# Patient Record
Sex: FEMALE | Race: WHITE | NOT HISPANIC OR LATINO | Employment: OTHER | URBAN - METROPOLITAN AREA
[De-identification: names, ages, dates, MRNs, and addresses within clinical notes are randomized per-mention and may not be internally consistent; named-entity substitution may affect disease eponyms.]

---

## 2017-06-19 ENCOUNTER — THERAPY VISIT (OUTPATIENT)
Dept: PHYSICAL THERAPY | Facility: CLINIC | Age: 67
End: 2017-06-19
Payer: COMMERCIAL

## 2017-06-19 DIAGNOSIS — M25.562 LEFT KNEE PAIN: Primary | ICD-10-CM

## 2017-06-19 DIAGNOSIS — M25.561 RIGHT KNEE PAIN: ICD-10-CM

## 2017-06-19 PROCEDURE — 97161 PT EVAL LOW COMPLEX 20 MIN: CPT | Mod: GP | Performed by: PHYSICAL THERAPIST

## 2017-06-19 PROCEDURE — G8979 MOBILITY GOAL STATUS: HCPCS | Mod: GP | Performed by: PHYSICAL THERAPIST

## 2017-06-19 PROCEDURE — 97110 THERAPEUTIC EXERCISES: CPT | Mod: GP | Performed by: PHYSICAL THERAPIST

## 2017-06-19 PROCEDURE — G8978 MOBILITY CURRENT STATUS: HCPCS | Mod: GP | Performed by: PHYSICAL THERAPIST

## 2017-06-19 ASSESSMENT — ACTIVITIES OF DAILY LIVING (ADL)
HOW_WOULD_YOU_RATE_THE_OVERALL_FUNCTION_OF_YOUR_KNEE_DURING_YOUR_USUAL_DAILY_ACTIVITIES?: ABNORMAL
RAW_SCORE: 40
SWELLING: I HAVE THE SYMPTOM BUT IT DOES NOT AFFECT MY ACTIVITY
GO UP STAIRS: ACTIVITY IS MINIMALLY DIFFICULT
HOW_WOULD_YOU_RATE_THE_CURRENT_FUNCTION_OF_YOUR_KNEE_DURING_YOUR_USUAL_DAILY_ACTIVITIES_ON_A_SCALE_FROM_0_TO_100_WITH_100_BEING_YOUR_LEVEL_OF_KNEE_FUNCTION_PRIOR_TO_YOUR_INJURY_AND_0_BEING_THE_INABILITY_TO_PERFORM_ANY_OF_YOUR_USUAL_DAILY_ACTIVITIES?: 50
PAIN: THE SYMPTOM AFFECTS MY ACTIVITY MODERATELY
LIMPING: THE SYMPTOM AFFECTS MY ACTIVITY MODERATELY
KNEEL ON THE FRONT OF YOUR KNEE: ACTIVITY IS FAIRLY DIFFICULT
STAND: ACTIVITY IS SOMEWHAT DIFFICULT
SIT WITH YOUR KNEE BENT: ACTIVITY IS MINIMALLY DIFFICULT
SQUAT: ACTIVITY IS VERY DIFFICULT
WEAKNESS: THE SYMPTOM AFFECTS MY ACTIVITY MODERATELY
RISE FROM A CHAIR: ACTIVITY IS MINIMALLY DIFFICULT
STIFFNESS: I DO NOT HAVE THE SYMPTOM
GO DOWN STAIRS: ACTIVITY IS FAIRLY DIFFICULT
AS_A_RESULT_OF_YOUR_KNEE_INJURY,_HOW_WOULD_YOU_RATE_YOUR_CURRENT_LEVEL_OF_DAILY_ACTIVITY?: ABNORMAL
WALK: ACTIVITY IS SOMEWHAT DIFFICULT
KNEE_ACTIVITY_OF_DAILY_LIVING_SUM: 40
KNEE_ACTIVITY_OF_DAILY_LIVING_SCORE: 57.14
GIVING WAY, BUCKLING OR SHIFTING OF KNEE: THE SYMPTOM AFFECTS MY ACTIVITY MODERATELY

## 2017-06-19 NOTE — LETTER
[facility]  [address] [address]  [phone]    2017  Re: Ekaterina Delcid   :   1950  MRN:  [id number]   REFERRING PHYSICIAN:   Garo Urena    [facility]  Date of Initial Evaluation:  2017  Visits:  Rxs Used: 1  Reason for Referral:     Left knee pain  Right knee pain  Physical Therapy Initial Evaluation   17  Garo Urena MD Precautions/Restrictions/MD instructions: B knee pain, E and T   Therapist Impression:   Pt is a 68 y/o female, with 3 month history of B knee pain. Pt presents with pain, weakness, effusion, and balance deficits. These impairments limit their ability to ambulate, navigate stairs, perform ADL's, and complete household duties. Skilled PT services necessary in order to reduce impairments and improve independent function.  Subjective:   Chief Complaint: Both knees are bothersome over the past few months it has increased. L lateral knee pain and R posterior knee pain. Notes that stairs are the most bothersome for her knees. Feel two weeks ago on L knee (xrays demo OA but no fracture). Does note that her back has been bugging her more as well.  DOI/onset: past 3 months increased DOS: past L TKA ()  Location: see above Quality: achy and sometimes sharp  Frequency: stairs Radiates: nil  Pain scale: Rest 0/10 Activity 6/10   Time of day: with WB activity Sleeping: not sleeping well   Exacerbated by: stairs specifically but also walking Relieved by: rest, ice Progression: worsening over the past couple of weeks  Previous Treatment: PT last eyar Effect of prior treatment: helpful, but stopped doing exercises   PMH and/or surgical history: L TKA Dec 2015, OA, Cancer, overweight, heart problems, depression, thyroid problems, menopausal, B 1st MTP fusions   Imaging: x rays demo OA on R knee, no acute fx    Occupation: retrired Job duties:  household duties   Current HEP/exercise regimen: none currently Patient's goals: cardiac, thyroid, sleep, antidepressants  Re: Ekaterina Delcid   :   1950    Medications: cardiac, thyroid, sleep, anti-depressants  General health as reported by patient: good  Return to MD: PRN         Objective:  Knee Evaluation:  Gait: reduced control of pronation, increased femoral IR, B  Functional:   Single-Limb Support: incr hip and ankle sway on R, L better  Effusion Stroke Test: trace on R, none on L  PROM:   Knee End Feel   Left 5-0-126 WNL   Right 5-0-131 pain   Strength:   L R   HIP     Ext 4+/5 4/5   Abd 4/5 3+/5   ER 4+/5 4/5   KNEE     Quad Set painful painful   SLR W/o lag W/o lag     Assessment/Plan:    Patient is a 67 year old female with both sides knee complaints.    Patient has the following significant findings with corresponding treatment plan.                Diagnosis 1:  B knee pain  Pain -  hot/cold therapy, manual therapy, splint/taping/bracing/orthotics, education and home program  Decreased ROM/flexibility - manual therapy and therapeutic exercise  Decreased joint mobility - manual therapy and therapeutic exercise  Decreased strength - therapeutic exercise and therapeutic activities  Impaired balance - neuro re-education and therapeutic activities  Decreased proprioception - neuro re-education and therapeutic activities  Edema - cold therapy  Impaired gait - gait training  Impaired muscle performance - neuro re-education  Decreased function - therapeutic activities  Impaired posture - neuro re-education              Re: Ekaterina Delcid   :   1950    Therapy Evaluation Codes:   1) History comprised of:   Personal factors that impact the plan of care:      None.    Comorbidity factors that impact the plan of care are:      Cancer, Depression, Heart problems, Menopausal, Osteoarthritis, Overweight and Pain at night/rest.     Medications impacting care: Anti-depressant, Cardiac, Sleep and  thyroid.  2) Examination of Body Systems comprised of:   Body structures and functions that impact the plan of care:      Hip and Knee.   Activity limitations that impact the plan of care are:      Dressing, Sitting, Squatting/kneeling, Stairs, Standing, Walking and Sleeping.  3) Clinical presentation characteristics are:   Stable/Uncomplicated.  4) Decision-Making    Moderate complexity using standardized patient assessment instrument and/or measureable assessment of functional outcome.  Cumulative Therapy Evaluation is: Low complexity.    Previous and current functional limitations:  (See Goal Flow Sheet for this information)    Short term and Long term goals: (See Goal Flow Sheet for this information)   Communication ability:  Patient appears to be able to clearly communicate and understand verbal and written communication and follow directions correctly.  Treatment Explanation - The following has been discussed with the patient:   RX ordered/plan of care  Anticipated outcomes  Possible risks and side effects  This patient would benefit from PT intervention to resume normal activities.   Rehab potential is good.  Frequency:  1 X week, once daily  Duration:  for 4 weeks tapering to 2 X a month over 4 weeks  Discharge Plan:  Achieve all LTG.  Independent in home treatment program.  Reach maximal therapeutic benefit.    Thank you for your referral.    INQUIRIES  Therapist: Roscoe Gorman, PT, DPT, SCS, CSCS  [facility] PHYSICAL THERAPY  [address] [place] [id number]  Phone: [phone]  Fax: [phone]

## 2017-06-19 NOTE — PROGRESS NOTES
Subjective:  Physical Therapy Initial Evaluation   6/19/17  Garo Urena MD Precautions/Restrictions/MD instructions: B knee pain, E and T   Therapist Impression:   Pt is a 68 y/o female, with 3 month history of B knee pain. Pt presents with pain, weakness, effusion, and balance deficits. These impairments limit their ability to ambulate, navigate stairs, perform ADL's, and complete household duties. Skilled PT services necessary in order to reduce impairments and improve independent function.    Subjective:   Chief Complaint: Both knees are bothersome over the past few months it has increased. L lateral knee pain and R posterior knee pain. Notes that stairs are the most bothersome for her knees. Feel two weeks ago on L knee (xrays demo OA but no fracture). Does note that her back has been bugging her more as well.  DOI/onset: 3/19/17 DOS: past L TKA (2015)  Location: see above Quality: achy and sometimes sharp  Frequency: stairs Radiates: nil  Pain scale: Rest 0/10 Activity 6/10   Time of day: with WB activity Sleeping: not sleeping well   Exacerbated by: stairs specifically but also walking Relieved by: rest, ice Progression: worsening over the past couple of weeks  Previous Treatment: PT last eyar Effect of prior treatment: helpful, but stopped doing exercises   PMH and/or surgical history: L TKA Dec 2015, OA, Cancer, overweight, heart problems, depression, thyroid problems, menopausal, B 1st MTP fusions   Imaging: x rays demo OA on R knee, no acute fx    Occupation: retrired Job duties: household duties   Current HEP/exercise regimen: none currently Patient's goals: cardiac, thyroid, sleep, antidepressants  Medications: cardiac, thyroid, sleep, anti-depressants  General health as reported by patient: good  Return to MD: PRN  HPI                  Objective:  Knee Evaluation:  Gait: reduced control of pronation, increased femoral IR, B    Functional:    Single-Limb Support: incr hip and ankle sway on R, L  better    Effusion Stroke Test: trace on R, none on L     PROM:   Knee End Feel   Left 5-0-126 WNL   Right 5-0-131 pain   Strength:   L R   HIP     Ext 4+/5 4/5   Abd 4/5 3+/5   ER 4+/5 4/5   KNEE     Quad Set painful painful   SLR W/o lag W/o lag     System    Physical Exam    General     ROS    Assessment/Plan:      Patient is a 67 year old female with both sides knee complaints.    Patient has the following significant findings with corresponding treatment plan.                Diagnosis 1:  B knee pain  Pain -  hot/cold therapy, manual therapy, splint/taping/bracing/orthotics, education and home program  Decreased ROM/flexibility - manual therapy and therapeutic exercise  Decreased joint mobility - manual therapy and therapeutic exercise  Decreased strength - therapeutic exercise and therapeutic activities  Impaired balance - neuro re-education and therapeutic activities  Decreased proprioception - neuro re-education and therapeutic activities  Edema - cold therapy  Impaired gait - gait training  Impaired muscle performance - neuro re-education  Decreased function - therapeutic activities  Impaired posture - neuro re-education    Therapy Evaluation Codes:   1) History comprised of:   Personal factors that impact the plan of care:      None.    Comorbidity factors that impact the plan of care are:      Cancer, Depression, Heart problems, Menopausal, Osteoarthritis, Overweight and Pain at night/rest.     Medications impacting care: Anti-depressant, Cardiac, Sleep and thyroid.  2) Examination of Body Systems comprised of:   Body structures and functions that impact the plan of care:      Hip and Knee.   Activity limitations that impact the plan of care are:      Dressing, Sitting, Squatting/kneeling, Stairs, Standing, Walking and Sleeping.  3) Clinical presentation characteristics are:   Stable/Uncomplicated.  4) Decision-Making    Moderate complexity using standardized patient assessment instrument and/or  measureable assessment of functional outcome.  Cumulative Therapy Evaluation is: Low complexity.    Previous and current functional limitations:  (See Goal Flow Sheet for this information)    Short term and Long term goals: (See Goal Flow Sheet for this information)     Communication ability:  Patient appears to be able to clearly communicate and understand verbal and written communication and follow directions correctly.  Treatment Explanation - The following has been discussed with the patient:   RX ordered/plan of care  Anticipated outcomes  Possible risks and side effects  This patient would benefit from PT intervention to resume normal activities.   Rehab potential is good.    Frequency:  1 X week, once daily  Duration:  for 4 weeks tapering to 2 X a month over 4 weeks  Discharge Plan:  Achieve all LTG.  Independent in home treatment program.  Reach maximal therapeutic benefit.    Please refer to the daily flowsheet for treatment today, total treatment time and time spent performing 1:1 timed codes.

## 2017-06-19 NOTE — LETTER
DEPARTMENT OF HEALTH AND HUMAN SERVICES  [facility] FOR MEDICARE & MEDICAID SERVICES    PLAN/UPDATED PLAN OF PROGRESS FOR OUTPATIENT REHABILITATION    PATIENTS NAME:  Ekaterina Delcid   : 1950  PROVIDER NUMBER:    [id number]  [id number]  [facility] PHYSICAL THERAPY  MEDICAL RECORD NUMBER: [id number]   START OF CARE DATE:  SOC Date: 17   TYPE:  PT  PRIMARY/TREATMENT DIAGNOSIS: (Pertinent Medical Diagnosis)  Left knee pain  Right knee pain  VISITS FROM START OF CARE:  1  Rxs Used: 1    Physical Therapy Initial Evaluation   17  Garo Urena MD Precautions/Restrictions/MD instructions: B knee pain, E and T   Therapist Impression:   Pt is a 68 y/o female, with 3 month history of B knee pain. Pt presents with pain, weakness, effusion, and balance deficits. These impairments limit their ability to ambulate, navigate stairs, perform ADL's, and complete household duties. Skilled PT services necessary in order to reduce impairments and improve independent function.    Subjective:   Chief Complaint: Both knees are bothersome over the past few months it has increased. L lateral knee pain and R posterior knee pain. Notes that stairs are the most bothersome for her knees. Feel two weeks ago on L knee (xrays demo OA but no fracture). Does note that her back has been bugging her more as well.  DOI/onset: 3/19/17 DOS: past L TKA ()  Location: see above Quality: achy and sometimes sharp  Frequency: stairs Radiates: nil  Pain scale: Rest 0/10 Activity 6/10   Time of day: with WB activity Sleeping: not sleeping well   Exacerbated by: stairs specifically but also walking Relieved by: rest, ice Progression: worsening over the past couple of weeks  Previous Treatment: PT last eyar Effect of prior treatment: helpful, but stopped doing exercises   PMH and/or surgical history: L TKA Dec 2015, OA, Cancer, overweight, heart problems, depression, thyroid problems,  menopausal, B 1st MTP fusions     PATIENTS NAME:  Ekaterina Delcid   : 1950    Imaging: x rays demo OA on R knee, no acute fx    Occupation: retrired Job duties: household duties   Current HEP/exercise regimen: none currently Patient's goals: cardiac, thyroid, sleep, antidepressants  Medications: cardiac, thyroid, sleep, anti-depressants  General health as reported by patient: good  Return to MD: PRN                    Objective:  Knee Evaluation:  Gait: reduced control of pronation, increased femoral IR, B  Functional:    Single-Limb Support: incr hip and ankle sway on R, L better    Effusion Stroke Test: trace on R, none on L    PROM:   Knee End Feel   Left 5-0-126 WNL   Right 5-0-131 pain     Strength:   L R   HIP     Ext 4+/5 4/5   Abd 4/5 3+/5   ER 4+/5 4/5   KNEE     Quad Set painful painful   SLR W/o lag W/o lag     Assessment/Plan:    Patient is a 67 year old female with both sides knee complaints.    Patient has the following significant findings with corresponding treatment plan.               PATIENTS NAME:  Ekaterina Delcid   : 1950      Diagnosis 1:  B knee pain  Pain -  hot/cold therapy, manual therapy, splint/taping/bracing/orthotics, education and home program  Decreased ROM/flexibility - manual therapy and therapeutic exercise  Decreased joint mobility - manual therapy and therapeutic exercise  Decreased strength - therapeutic exercise and therapeutic activities  Impaired balance - neuro re-education and therapeutic activities  Decreased proprioception - neuro re-education and therapeutic activities  Edema - cold therapy  Impaired gait - gait training  Impaired muscle performance - neuro re-education  Decreased function - therapeutic activities  Impaired posture - neuro re-education  Therapy Evaluation Codes:   1) History comprised of:   Personal factors that impact the plan of care:      None.    Comorbidity factors that impact the plan of care are:      Cancer, Depression, Heart  problems, Menopausal, Osteoarthritis, Overweight and Pain at night/rest.     Medications impacting care: Anti-depressant, Cardiac, Sleep and thyroid.  2) Examination of Body Systems comprised of:   Body structures and functions that impact the plan of care:      Hip and Knee.   Activity limitations that impact the plan of care are:      Dressing, Sitting, Squatting/kneeling, Stairs, Standing, Walking and Sleeping.  3) Clinical presentation characteristics are:   Stable/Uncomplicated.  4) Decision-Making    Moderate complexity using standardized patient assessment instrument and/or measureable assessment of functional outcome.  Cumulative Therapy Evaluation is: Low complexity.  Previous and current functional limitations:  (See Goal Flow Sheet for this information)    Short term and Long term goals: (See Goal Flow Sheet for this information)   Communication ability:  Patient appears to be able to clearly communicate and understand verbal and written communication and follow directions correctly.  Treatment Explanation - The following has been discussed with the patient:   RX ordered/plan of care  Anticipated outcomes  Possible risks and side effects  This patient would benefit from PT intervention to resume normal activities.   Rehab potential is good.  Frequency:  1 X week, once daily  Duration:  for 4 weeks tapering to 2 X a month over 4 weeks  Discharge Plan:  Achieve all LTG.  Independent in home treatment program.  Reach maximal therapeutic benefit.        PATIENTS NAME:  Ekaterina Delcid   : 1950                Caregiver Signature/Credentials _____________________________ Date ________                Roscoe Gorman DPT   I have reviewed and certified the need for these services and plan of treatment while under my care.        PHYSICIAN'S SIGNATURE:   _____________________________________  Date___________              Garo Urena MD    Certification period:  Beginning of Cert date period: 17  "to  End of Cert period date: 09/13/17     Functional Level Progress Report: Please see attached \"Goal Flow sheet for Functional level.\"    ____X____ Continue Services or       ________ DC Services                Service dates: From  SOC Date: 06/19/17 date to present                         "

## 2017-06-19 NOTE — MR AVS SNAPSHOT
After Visit Summary   6/19/2017    Ekaterina Delcid    MRN: [id number]           Patient Information     Date Of Birth          1950        Visit Information        Provider Department      6/19/2017 11:30 AM Roscoe Gorman PT [facility] Physical Therapy        Today's Diagnoses     Left knee pain    -  1    Right knee pain           Follow-ups after your visit        Your next 10 appointments already scheduled     Jun 27, 2017 11:30 AM CDT   EMIR Extremity with Roscoe Gorman PT   [facility] Physical Therapy ([facility]  )    [address] [place] [address]   [phone]            Jul 06, 2017 12:40 PM CDT   EMIR Extremity with Roscoe Gorman PT   [facility] Physical Therapy ([facility]  )    [address] [place] [address]   [phone]            Jul 13, 2017  8:10 AM CDT   EMIR Extremity with Roscoe Gorman PT   [facility]  [facility]    [address] [place] [address]   [phone]            Jul 20, 2017  8:10 AM CDT   EMIR Extremity with Roscoe Gorman PT   [facility] Physical Therapy ([facility]  )    [address] [place] [address]   [phone]              Who to contact     If you have questions or need follow up information about today's clinic visit or your schedule please contact [facility] PHYSICAL THERAPY directly at [phone].  Normal or non-critical lab and imaging results will be communicated to you by MyChart, letter or phone within 4 business days after the clinic has received the results. If you do not hear from us within 7 days, please contact the clinic through MyChart or phone. If you have a critical or abnormal lab result, we will notify you by phone as soon as possible.  Submit  "refill requests through [employer] or call your pharmacy and they will forward the refill request to us. Please allow 3 business days for your refill to be completed.          Additional Information About Your Visit        [employer] Information     [employer] lets you send messages to your doctor, view your test results, renew your prescriptions, schedule appointments and more. To sign up, go to [email or web address] . Click on \"Log in\" on the left side of the screen, which will take you to the Welcome page. Then click on \"Sign up Now\" on the right side of the page.     You will be asked to enter the access code listed below, as well as some personal information. Please follow the directions to create your username and password.     Your access code is: [email or web address]  Expires: 2017 12:52 PM     Your access code will  in 90 days. If you need help or a new code, please call your [place] clinic or [phone].        Care EveryWhere ID     This is your Care EveryWhere ID. This could be used by other organizations to access your [facility] medical records  [id number]         Blood Pressure from Last 3 Encounters:   05/19/15 133/89   11/10/14 135/64   14 123/83    Weight from Last 3 Encounters:   14 72.8 kg (160 lb 8 oz)   14 73.5 kg (162 lb)   14 73.5 kg (162 lb)              We Performed the Following     HC PT EVAL, LOW COMPLEXITY     EMIR INITIAL EVAL REPORT     THERAPEUTIC EXERCISES        Primary Care Provider Office Phone # Fax #    Mark Torres [phone]       [facility] MED SPEC [address]   [facility] [address]        Thank you!     Thank you for choosing [facility] PHYSICAL THERAPY  for your care. Our goal is always to provide you with excellent care. Hearing back from our patients is one way we can continue to improve our services. Please take a few minutes to complete the written survey that you may receive in " the mail after your visit with us. Thank you!             Your Updated Medication List - Protect others around you: Learn how to safely use, store and throw away your medicines at www.disposemymeds.org.          This list is accurate as of: 6/19/17 12:52 PM.  Always use your most recent med list.                   Brand Name Dispense Instructions for use    ASPIRIN PO      Take 81 mg by mouth daily       ATORVASTATIN CALCIUM PO      Take 40 mg by mouth daily       BUSPIRONE HCL PO      Take 30 mg by mouth 2 times daily       carboxymethylcellulose 0.5 % Soln ophthalmic solution    REFRESH PLUS    1 Bottle    Place 1 drop into both eyes 3 times daily as needed for dry eyes       COENZYME Q-10 PO      Take 100 mg by mouth daily        MG Caps      Take 3 capsules by mouth daily       DHA-EPA-Vit B6-B12-Folic Acid Caps      Take 1 capsule by mouth daily       diclofenac 1 % Gel topical gel    VOLTAREN    100 g    Apply 4 grams to knees or 2 grams to hands four times daily using enclosed dosing card.       gabapentin 300 MG capsule    NEURONTIN    120 capsule    Take 1 capsule (300 mg) by mouth 4 times daily       LEVOTHYROXINE SODIUM PO      Take 0.125 mg by mouth 1 tablet 6 days per week and 1/2 tablet the 7th day       LISINOPRIL PO      Take 5 mg by mouth daily       magnesium 100 MG Caps      Take 100 mg by mouth daily       METOPROLOL TARTRATE PO      Take 12.5 mg by mouth daily       MULTIVITAMIN PO      Take 2 capsules by mouth daily       NITROGLYCERIN SL      Place 0.4 mg under the tongue every 5 minutes as needed       TYLENOL PO      Take 500-1,000 mg by mouth every 4 hours as needed for mild pain or fever       VISTARIL PO      Take 50 mg by mouth 4 times daily       Vitamin D-3 1000 UNITS Caps      Take 1 capsule by mouth daily       ZOLOFT 100 MG tablet   Generic drug:  sertraline      Take by mouth daily       zolpidem 6.25 MG CR tablet    AMBIEN CR    15 tablet    Take 1 tablet (6.25 mg) by mouth  nightly as needed for sleep

## 2017-06-19 NOTE — LETTER
DEPARTMENT OF HEALTH AND HUMAN SERVICES  CENTERS FOR MEDICARE & MEDICAID SERVICES    PLAN/UPDATED PLAN OF PROGRESS FOR OUTPATIENT REHABILITATION    PATIENTS NAME:  Ekaterina Delcid   : 1950  PR[facility] NUMBER:    [id number]  [facility]     [facility]ER NAME: [place] [facility]ICAL [facility]  MEDICAL RECORD NUMBER: [id number]   START OF CARE DATE:    2017  TYPE:  PT  PRIMARY/TREATMENT DIAGNOSIS: (Pertinent Medical Diagnosis)  Left knee pain  Right knee pain  VISITS FROM START OF CARE:  Rxs Used: 1     Physical Therapy Initial Evaluation   17  Garo Urena MD Precautions/Restrictions/MD instructions: B knee pain, E and T   Therapist Impression:   Pt is a 68 y/o female, with 3 month history of B knee pain. Pt presents with pain, weakness, effusion, and balance deficits. These impairments limit their ability to ambulate, navigate stairs, perform ADL's, and complete household duties. Skilled PT services necessary in order to reduce impairments and improve independent function.    Subjective:   Chief Complaint: Both knees are bothersome over the past few months it has increased. L lateral knee pain and R posterior knee pain. Notes that stairs are the most bothersome for her knees. Feel two weeks ago on L knee (xrays demo OA but no fracture). Does note that her back has been bugging her more as well.  DOI/onset: past 3 months increased DOS: past L TKA ()  Location: see above Quality: achy and sometimes sharp  Frequency: stairs Radiates: nil  Pain scale: Rest 0/10 Activity 6/10   Time of day: with WB activity Sleeping: not sleeping well   Exacerbated by: stairs specifically but also walking Relieved by: rest, ice Progression: worsening over the past couple of weeks  Previous Treatment: PT last eyar Effect of prior treatment: helpful, but stopped doing exercises   PMH and/or surgical history: L TKA Dec 2015, OA, Cancer, overweight, heart problems, depression, thyroid  problems, menopausal, B 1st MTP fusions   PATIENTS NAME:  Ekaterina Delcid   : 1950    Imaging: x rays demo OA on R knee, no acute fx    Occupation: retrired Job duties: household duties   Current HEP/exercise regimen: none currently Patient's goals: cardiac, thyroid, sleep, antidepressants  Medications: cardiac, thyroid, sleep, anti-depressants  General health as reported by patient: good  Return to MD: PRN  HPI                  Objective:  Knee Evaluation:  Gait: reduced control of pronation, increased femoral IR, B    Functional:    Single-Limb Support: incr hip and ankle sway on R, L better    Effusion Stroke Test: trace on R, none on L     PROM:   Knee End Feel   Left 5-0-126 WNL   Right 5-0-131 pain   Strength:   L R   HIP     Ext 4+/5 4/5   Abd 4/5 3+/5   ER 4+/5 4/5   KNEE     Quad Set painful painful   SLR W/o lag W/o lag     Assessment/Plan:    Patient is a 67 year old female with both sides knee complaints.    Patient has the following significant findings with corresponding treatment plan.                Diagnosis 1:  B knee pain  Pain -  hot/cold therapy, manual therapy, splint/taping/bracing/orthotics, education and home program  Decreased ROM/flexibility - manual therapy and therapeutic exercise  Decreased joint mobility - manual therapy and therapeutic exercise  Decreased strength - therapeutic exercise and therapeutic activities  PATIENTS NAME:  Ekaterina Delcid   : 1950    Impaired balance - neuro re-education and therapeutic activities  Decreased proprioception - neuro re-education and therapeutic activities  Edema - cold therapy  Impaired gait - gait training  Impaired muscle performance - neuro re-education  Decreased function - therapeutic activities  Impaired posture - neuro re-education    Therapy Evaluation Codes:   1) History comprised of:   Personal factors that impact the plan of care:      None.    Comorbidity factors that impact the plan of care are:      Cancer,  Depression, Heart problems, Menopausal, Osteoarthritis, Overweight and Pain at night/rest.     Medications impacting care: Anti-depressant, Cardiac, Sleep and thyroid.  2) Examination of Body Systems comprised of:   Body structures and functions that impact the plan of care:      Hip and Knee.   Activity limitations that impact the plan of care are:      Dressing, Sitting, Squatting/kneeling, Stairs, Standing, Walking and Sleeping.  3) Clinical presentation characteristics are:   Stable/Uncomplicated.  4) Decision-Making    Moderate complexity using standardized patient assessment instrument and/or measureable assessment of functional outcome.  Cumulative Therapy Evaluation is: Low complexity.  Previous and current functional limitations:  (See Goal Flow Sheet for this information)    Short term and Long term goals: (See Goal Flow Sheet for this information)   Communication ability:  Patient appears to be able to clearly communicate and understand verbal and written communication and follow directions correctly.  Treatment Explanation - The following has been discussed with the patient:   RX ordered/plan of care  Anticipated outcomes  Possible risks and side effects  This patient would benefit from PT intervention to resume normal activities.   Rehab potential is good.  Frequency:  1 X week, once daily  Duration:  for 4 weeks tapering to 2 X a month over 4 weeks  Discharge Plan:  Achieve all LTG.  Independent in home treatment program.  Reach maximal therapeutic benefit.                  PATIENTS NAME:  Ekaterina Delcid   : 1950              Caregiver Signature/Credentials _____________________________ Date ________              Roscoe Gorman DPT   I have reviewed and certified the need for these services and plan of treatment while under my care.        PHYSICIAN'S SIGNATURE:   _____________________________________  Date___________               Garo Urena MD    Certification period:   Beginning of  "Cert date period: 07/13/2017 to End of Cert date period: 10/10/2017       Functional Level Progress Report: Please see attached \"Goal Flow sheet for Functional level.\"    ____X____ Continue Services or       ________ DC Services                Service dates: From   SOC Date: 06/19/2017 date to present                         "

## 2017-06-27 ENCOUNTER — THERAPY VISIT (OUTPATIENT)
Dept: PHYSICAL THERAPY | Facility: CLINIC | Age: 67
End: 2017-06-27
Payer: COMMERCIAL

## 2017-06-27 DIAGNOSIS — M25.562 LEFT KNEE PAIN: ICD-10-CM

## 2017-06-27 DIAGNOSIS — M25.561 RIGHT KNEE PAIN: ICD-10-CM

## 2017-06-27 PROCEDURE — 97112 NEUROMUSCULAR REEDUCATION: CPT | Mod: GP | Performed by: PHYSICAL THERAPIST

## 2017-06-27 PROCEDURE — 97110 THERAPEUTIC EXERCISES: CPT | Mod: GP | Performed by: PHYSICAL THERAPIST

## 2017-07-13 ENCOUNTER — THERAPY VISIT (OUTPATIENT)
Dept: PHYSICAL THERAPY | Facility: CLINIC | Age: 67
End: 2017-07-13
Payer: MEDICARE

## 2017-07-13 DIAGNOSIS — M25.562 LEFT KNEE PAIN: ICD-10-CM

## 2017-07-13 DIAGNOSIS — M25.561 RIGHT KNEE PAIN: ICD-10-CM

## 2017-07-13 PROCEDURE — G8978 MOBILITY CURRENT STATUS: HCPCS | Mod: GP | Performed by: PHYSICAL THERAPIST

## 2017-07-13 PROCEDURE — 97112 NEUROMUSCULAR REEDUCATION: CPT | Mod: GP | Performed by: PHYSICAL THERAPIST

## 2017-07-13 PROCEDURE — 97110 THERAPEUTIC EXERCISES: CPT | Mod: GP | Performed by: PHYSICAL THERAPIST

## 2017-07-13 PROCEDURE — G8979 MOBILITY GOAL STATUS: HCPCS | Mod: GP | Performed by: PHYSICAL THERAPIST

## 2017-07-13 NOTE — MR AVS SNAPSHOT
After Visit Summary   7/13/2017    Ekaterina Delcid    MRN: [id number]           Patient Information     Date Of Birth          1950        Visit Information        Provider Department      7/13/2017 8:10 AM Roscoe Gorman, PT [facility]        Today's Diagnoses     Left knee pain        Right knee pain           Follow-ups after your visit        Your next 10 appointments already scheduled     Jul 20, 2017  8:10 AM CDT   EMIR Extremity with Roscoe Gorman PT   [facility] ([facility]  )    [address] [place] [address]   [phone]            Jul 25, 2017  9:30 AM CDT   EMIR Extremity with Roscoe Gorman PT   [facility] ([facility]  )    [address] [place] [address]   [phone]            Aug 02, 2017  8:10 AM CDT   EMIR Extremity with Varinder Moeller PT   [facility] ([facility]  )    [address] [place] [address]   [phone]            Aug 09, 2017  8:10 AM CDT   EMIR Extremity with Varinder Moeller PT   [facility] ([facility]  )    [address] [place] [address]   [phone]              Who to contact     If you have questions or need follow up information about today's clinic visit or your schedule please contact [facility] ATHLETIC [facility] PHYSICAL THERAPY directly at [phone].  Normal or non-critical lab and imaging results will be communicated to you by MyChart, letter or phone within 4 business days after the clinic has received the results. If you do not hear from us within 7 days, please contact the clinic through MyChart or phone. If you have a critical or abnormal lab result, we will notify you by phone as soon as  "possible.  Submit refill requests through Hoods or call your pharmacy and they will forward the refill request to us. Please allow 3 business days for your refill to be completed.          Additional Information About Your Visit        [email or web address] [email or web address] Information     Hoods lets you send messages to your doctor, view your test results, renew your prescriptions, schedule appointments and more. To sign up, go to [email or web address] . Click on \"Log in\" on the left side of the screen, which will take you to the Welcome page. Then click on \"Sign up Now\" on the right side of the page.     You will be asked to enter the access code listed below, as well as some personal information. Please follow the directions to create your username and password.     Your access code is: [email or web address]  Expires: 2017 12:52 PM     Your access code will  in 90 days. If you need help or a new code, please call your [place] clinic or [phone].        Care EveryWhere ID     This is your Care EveryWhere ID. This could be used by other organizations to access your [place] medical records  [id number]         Blood Pressure from Last 3 Encounters:   05/19/15 133/89   11/10/14 135/64   14 123/83    Weight from Last 3 Encounters:   14 72.8 kg (160 lb 8 oz)   14 73.5 kg (162 lb)   14 73.5 kg (162 lb)              We Performed the Following     NEUROMUSCULAR RE-EDUCATION     THERAPEUTIC EXERCISES        Primary Care Provider Office Phone # Fax #    Mark Torres [phone]       [facility] SPEC [address] [facility] [address]  [facility] [address]        Equal Access to Services     Sum[facility] CampusJOVITA : Hadii ihsan robledo Soguevara, waaxda luqadaha, qaybta kaalmada micki, zafar gurrola . So [facility] [phone].    ATENCIÓN: Si habla español, tiene a dobson disposición servicios gratuitos de asistencia lingüística. Llame al [phone].    We comply with applicable federal " civil rights laws and [place] laws. We do not discriminate on the basis of race, color, national origin, age, disability sex, sexual orientation or gender identity.            Thank you!     Thank you for choosing [place] [facility] PHYSICAL [facility]  for your care. Our goal is always to provide you with excellent care. Hearing back from our patients is one way we can continue to improve our services. Please take a few minutes to complete the written survey that you may receive in the mail after your visit with us. Thank you!             Your Updated Medication List - Protect others around you: Learn how to safely use, store and throw away your medicines at www.disposemymeds.org.          This list is accurate as of: 7/13/17  9:00 AM.  Always use your most recent med list.                   Brand Name Dispense Instructions for use Diagnosis    ASPIRIN PO      Take 81 mg by mouth daily        ATORVASTATIN CALCIUM PO      Take 40 mg by mouth daily        BUSPIRONE HCL PO      Take 30 mg by mouth 2 times daily        carboxymethylcellulose 0.5 % Soln ophthalmic solution    REFRESH PLUS    1 Bottle    Place 1 drop into both eyes 3 times daily as needed for dry eyes    Dry eyes, bilateral       COENZYME Q-10 PO      Take 100 mg by mouth daily         MG Caps      Take 3 capsules by mouth daily        DHA-EPA-Vit B6-B12-Folic Acid Caps      Take 1 capsule by mouth daily        diclofenac 1 % Gel topical gel    VOLTAREN    100 g    Apply 4 grams to knees or 2 grams to hands four times daily using enclosed dosing card.    Generalized osteoarthrosis, unspecified site       gabapentin 300 MG capsule    NEURONTIN    120 capsule    Take 1 capsule (300 mg) by mouth 4 times daily    Chronic pain       LEVOTHYROXINE SODIUM PO      Take 0.125 mg by mouth 1 tablet 6 days per week and 1/2 tablet the 7th day        LISINOPRIL PO      Take 5 mg by mouth daily        magnesium 100 MG Caps      Take  100 mg by mouth daily        METOPROLOL TARTRATE PO      Take 12.5 mg by mouth daily        MULTIVITAMIN PO      Take 2 capsules by mouth daily        NITROGLYCERIN SL      Place 0.4 mg under the tongue every 5 minutes as needed        TYLENOL PO      Take 500-1,000 mg by mouth every 4 hours as needed for mild pain or fever        VISTARIL PO      Take 50 mg by mouth 4 times daily        Vitamin D-3 1000 UNITS Caps      Take 1 capsule by mouth daily        ZOLOFT 100 MG tablet   Generic drug:  sertraline      Take by mouth daily        zolpidem 6.25 MG CR tablet    AMBIEN CR    15 tablet    Take 1 tablet (6.25 mg) by mouth nightly as needed for sleep    Insomnia

## 2017-07-20 ENCOUNTER — THERAPY VISIT (OUTPATIENT)
Dept: PHYSICAL THERAPY | Facility: CLINIC | Age: 67
End: 2017-07-20
Payer: MEDICARE

## 2017-07-20 DIAGNOSIS — M25.561 RIGHT KNEE PAIN: ICD-10-CM

## 2017-07-20 DIAGNOSIS — M25.562 LEFT KNEE PAIN: ICD-10-CM

## 2017-07-20 PROCEDURE — 97110 THERAPEUTIC EXERCISES: CPT | Mod: GP | Performed by: PHYSICAL THERAPIST

## 2017-07-20 PROCEDURE — 97112 NEUROMUSCULAR REEDUCATION: CPT | Mod: GP | Performed by: PHYSICAL THERAPIST

## 2017-07-25 ENCOUNTER — THERAPY VISIT (OUTPATIENT)
Dept: PHYSICAL THERAPY | Facility: CLINIC | Age: 67
End: 2017-07-25
Payer: MEDICARE

## 2017-07-25 DIAGNOSIS — M25.561 RIGHT KNEE PAIN: ICD-10-CM

## 2017-07-25 DIAGNOSIS — M25.562 LEFT KNEE PAIN: ICD-10-CM

## 2017-07-25 PROCEDURE — 97110 THERAPEUTIC EXERCISES: CPT | Mod: GP | Performed by: PHYSICAL THERAPIST

## 2017-07-25 PROCEDURE — 97112 NEUROMUSCULAR REEDUCATION: CPT | Mod: GP | Performed by: PHYSICAL THERAPIST

## 2017-07-26 ENCOUNTER — RECORDS - HEALTHEAST (OUTPATIENT)
Dept: ADMINISTRATIVE | Facility: OTHER | Age: 67
End: 2017-07-26

## 2017-08-02 ENCOUNTER — THERAPY VISIT (OUTPATIENT)
Dept: PHYSICAL THERAPY | Facility: CLINIC | Age: 67
End: 2017-08-02
Payer: MEDICARE

## 2017-08-02 DIAGNOSIS — M25.561 RIGHT KNEE PAIN: ICD-10-CM

## 2017-08-02 DIAGNOSIS — M25.562 LEFT KNEE PAIN: ICD-10-CM

## 2017-08-02 PROCEDURE — 97112 NEUROMUSCULAR REEDUCATION: CPT | Mod: GP | Performed by: PHYSICAL THERAPIST

## 2017-08-02 PROCEDURE — 97110 THERAPEUTIC EXERCISES: CPT | Mod: GP | Performed by: PHYSICAL THERAPIST

## 2017-08-09 ENCOUNTER — THERAPY VISIT (OUTPATIENT)
Dept: PHYSICAL THERAPY | Facility: CLINIC | Age: 67
End: 2017-08-09
Payer: MEDICARE

## 2017-08-09 DIAGNOSIS — M25.562 LEFT KNEE PAIN: ICD-10-CM

## 2017-08-09 DIAGNOSIS — M25.561 RIGHT KNEE PAIN: ICD-10-CM

## 2017-08-09 PROCEDURE — 97112 NEUROMUSCULAR REEDUCATION: CPT | Mod: GP | Performed by: PHYSICAL THERAPIST

## 2017-08-09 PROCEDURE — 97110 THERAPEUTIC EXERCISES: CPT | Mod: GP | Performed by: PHYSICAL THERAPIST

## 2017-08-30 ENCOUNTER — THERAPY VISIT (OUTPATIENT)
Dept: PHYSICAL THERAPY | Facility: CLINIC | Age: 67
End: 2017-08-30
Payer: MEDICARE

## 2017-08-30 DIAGNOSIS — M25.561 RIGHT KNEE PAIN: Primary | ICD-10-CM

## 2017-08-30 DIAGNOSIS — M25.562 LEFT KNEE PAIN: ICD-10-CM

## 2017-08-30 PROCEDURE — 97110 THERAPEUTIC EXERCISES: CPT | Mod: GP | Performed by: PHYSICAL THERAPIST

## 2017-09-20 ENCOUNTER — THERAPY VISIT (OUTPATIENT)
Dept: PHYSICAL THERAPY | Facility: CLINIC | Age: 67
End: 2017-09-20
Payer: MEDICARE

## 2017-09-20 DIAGNOSIS — M25.562 LEFT KNEE PAIN: ICD-10-CM

## 2017-09-20 DIAGNOSIS — M25.561 RIGHT KNEE PAIN: ICD-10-CM

## 2017-09-20 PROCEDURE — 97110 THERAPEUTIC EXERCISES: CPT | Mod: GP | Performed by: PHYSICAL THERAPIST

## 2017-10-04 ENCOUNTER — THERAPY VISIT (OUTPATIENT)
Dept: PHYSICAL THERAPY | Facility: CLINIC | Age: 67
End: 2017-10-04
Payer: MEDICARE

## 2017-10-04 DIAGNOSIS — M25.562 LEFT KNEE PAIN: ICD-10-CM

## 2017-10-04 DIAGNOSIS — M25.561 RIGHT KNEE PAIN: ICD-10-CM

## 2017-10-04 PROCEDURE — 97110 THERAPEUTIC EXERCISES: CPT | Mod: GP | Performed by: PHYSICAL THERAPIST

## 2017-10-04 PROCEDURE — G8978 MOBILITY CURRENT STATUS: HCPCS | Mod: GP | Performed by: PHYSICAL THERAPIST

## 2017-10-04 PROCEDURE — G8979 MOBILITY GOAL STATUS: HCPCS | Mod: GP | Performed by: PHYSICAL THERAPIST

## 2017-10-04 NOTE — PROGRESS NOTES
Subjective:    HPI                    Objective:    System    Physical Exam    General     ROS    Assessment/Plan:      PROGRESS  REPORT    Progress reporting period is from 7/13/17 to 10/4/17.       SUBJECTIVE  Subjective changes noted by patient:  Subjective: Pt states that she is pretty flared up today with general soreness (hands, wrists, ankles, knees). Both knees are sore today (R>L).    Current Pain level: 4/10.     Initial Pain level: 5/10.   Changes in function:  None  Adverse reaction to treatment or activity: None    OBJECTIVE  Changes noted in objective findings:  Yes,   Objective: R knee 1+ effusion. R knee ERP with ext and flex. Good QS. SLR w/o lag. 30 flex SLR with BW, before fatigue. B hip abd 4/5.     ASSESSMENT/PLAN  Updated problem list and treatment plan: Diagnosis 1:  B knee pain  Pain -  hot/cold therapy, manual therapy, splint/taping/bracing/orthotics, education and home program  Decreased ROM/flexibility - manual therapy and therapeutic exercise  Decreased joint mobility - manual therapy and therapeutic exercise  Decreased strength - therapeutic exercise and therapeutic activities  Impaired balance - neuro re-education and therapeutic activities  Decreased proprioception - neuro re-education and therapeutic activities  Edema - cold therapy  Impaired gait - gait training  Impaired muscle performance - neuro re-education  Decreased function - therapeutic activities  Impaired posture - neuro re-education    STG/LTGs have been met or progress has been made towards goals:  Yes (See Goal flow sheet completed today.)  Assessment of Progress: The patient's condition has potential to improve.  Self Management Plans:  Patient has been instructed in a home treatment program.  I have re-evaluated this patient and find that the nature, scope, duration and intensity of the therapy is appropriate for the medical condition of the patient.  Ekaterina continues to require the following intervention to meet  STG and LTG's:  PT    Recommendations:  This patient would benefit from continued therapy.     Frequency:  2 X a month, once daily  Duration:  for 2 months          Please refer to the daily flowsheet for treatment today, total treatment time and time spent performing 1:1 timed codes.

## 2017-10-04 NOTE — MR AVS SNAPSHOT
"              After Visit Summary   10/4/2017    Ekaterina Delcid    MRN: [id number]           Patient Information     Date Of Birth          1950        Visit Information        Provider Department      10/4/2017 10:20 AM Roscoe Gorman PT [facility]        Today's Diagnoses     Left knee pain        Right knee pain           Follow-ups after your visit        Who to contact     If you have questions or need follow up information about today's clinic visit or your schedule please contact [facility] directly at [phone].  Normal or non-critical lab and imaging results will be communicated to you by SmartCrowdzhart, letter or phone within 4 business days after the clinic has received the results. If you do not hear from us within 7 days, please contact the clinic through SmartCrowdzhart or phone. If you have a critical or abnormal lab result, we will notify you by phone as soon as possible.  Submit refill requests through CargoGuard or call your pharmacy and they will forward the refill request to us. Please allow 3 business days for your refill to be completed.          Additional Information About Your Visit        MyChart Information     CargoGuard lets you send messages to your doctor, view your test results, renew your prescriptions, schedule appointments and more. To sign up, go to [email or web address] . Click on \"Log in\" on the left side of the screen, which will take you to the Welcome page. Then click on \"Sign up Now\" on the right side of the page.     You will be asked to enter the access code listed below, as well as some personal information. Please follow the directions to create your username and password.     Your access code is: [email or web address]  Expires: 2017  9:46 AM     Your access code will  in 90 days. If you need help or a new code, please call your [place] clinic or [phone].      "   Care EveryWhere ID     This is your Care EveryWhere ID. This could be used by other organizations to access your [place] medical records  [id number]         Blood Pressure from Last 3 Encounters:   05/19/15 133/89   11/10/14 135/64   09/30/14 123/83    Weight from Last 3 Encounters:   07/16/14 72.8 kg (160 lb 8 oz)   07/09/14 73.5 kg (162 lb)   07/09/14 73.5 kg (162 lb)              We Performed the Following     EMIR PROGRESS NOTES REPORT     EMIR RE-CERT REPORT     THERAPEUTIC EXERCISES        Primary Care Provider Office Phone # Fax #    Mark Torres [phone]       [facility] SPEC [address]   [facility] [address]        Equal Access to Services     K[place] MOREIRA : Hadii aad ku hadasho Soomaali, waaxda luqadaha, qaybta kaalmada adeegyada, waxay idiin hayaan wilfredo gurrola . So [facility] [phone].    ATENCIÓN: Si habla español, tiene a dobson disposición servicios gratuitos de asistencia lingüística. [facility] [phone].    We comply with applicable federal civil rights laws and [place] laws. We do not discriminate on the basis of race, color, national origin, age, disability, sex, sexual orientation, or gender identity.            Thank you!     [facility] you [facility] PH[facility]  for your care. Our goal is always to provide you with excellent care. Hearing back from our patients is one way we can continue to improve our services. Please take a few minutes to complete the written survey that you may receive in the mail after your visit with us. Thank you!             Your Updated Medication List - Protect others around you: Learn how to safely use, store and throw away your medicines at www.disposemymeds.org.          This list is accurate as of: 10/4/17 11:09 AM.  Always use your most recent med list.                   Brand Name Dispense Instructions for use Diagnosis    ASPIRIN PO      Take 81 mg by mouth daily         ATORVASTATIN CALCIUM PO      Take 40 mg by mouth daily        BUSPIRONE HCL PO      Take 30 mg by mouth 2 times daily        carboxymethylcellulose 0.5 % Soln ophthalmic solution    REFRESH PLUS    1 Bottle    Place 1 drop into both eyes 3 times daily as needed for dry eyes    Dry eyes, bilateral       COENZYME Q-10 PO      Take 100 mg by mouth daily         MG Caps      Take 3 capsules by mouth daily        DHA-EPA-Vit B6-B12-Folic Acid Caps      Take 1 capsule by mouth daily        diclofenac 1 % Gel topical gel    VOLTAREN    100 g    Apply 4 grams to knees or 2 grams to hands four times daily using enclosed dosing card.    Generalized osteoarthrosis, unspecified site       gabapentin 300 MG capsule    NEURONTIN    120 capsule    Take 1 capsule (300 mg) by mouth 4 times daily    Chronic pain       LEVOTHYROXINE SODIUM PO      Take 0.125 mg by mouth 1 tablet 6 days per week and 1/2 tablet the 7th day        LISINOPRIL PO      Take 5 mg by mouth daily        magnesium 100 MG Caps      Take 100 mg by mouth daily        METOPROLOL TARTRATE PO      Take 12.5 mg by mouth daily        MULTIVITAMIN PO      Take 2 capsules by mouth daily        NITROGLYCERIN SL      Place 0.4 mg under the tongue every 5 minutes as needed        TYLENOL PO      Take 500-1,000 mg by mouth every 4 hours as needed for mild pain or fever        VISTARIL PO      Take 50 mg by mouth 4 times daily        Vitamin D-3 1000 UNITS Caps      Take 1 capsule by mouth daily        ZOLOFT 100 MG tablet   Generic drug:  sertraline      Take by mouth daily        zolpidem 6.25 MG CR tablet    AMBIEN CR    15 tablet    Take 1 tablet (6.25 mg) by mouth nightly as needed for sleep    Insomnia

## 2017-10-04 NOTE — LETTER
DEPARTMENT OF HEALTH AND HUMAN SERVICES  CENTERS FOR MEDICARE & MEDICAID SERVICES    PLAN/UPDATED PLAN OF PROGRESS FOR OUTPATIENT REHABILITATION    PATIENTS NAME:  Ekaterina Delcid   : 1950  PROVIDER NUMBER:    [id number]  [id number]   PROVIDER NAME: [place] [facility] PHYSICAL [facility]  MEDICAL RECORD NUMBER: [id number]   START OF CARE DATE:  SOC Date: 17   TYPE:  PT  PRIMARY/TREATMENT DIAGNOSIS: (Pertinent Medical Diagnosis)  Left knee pain  Right knee pain  VISITS FROM START OF CARE:  10  Rxs Used: 10     PROGRESS  REPORT    Progress reporting period is from 17 to 10/4/17.       SUBJECTIVE  Subjective changes noted by patient:  Subjective: Pt states that she is pretty flared up today with general soreness (hands, wrists, ankles, knees). Both knees are sore today (R>L).    Current Pain level: 4/10.     Initial Pain level: 5/10.   Changes in function:  None  Adverse reaction to treatment or activity: None    OBJECTIVE  Changes noted in objective findings:  Yes,   Objective: R knee 1+ effusion. R knee ERP with ext and flex. Good QS. SLR w/o lag. 30 flex SLR with BW, before fatigue. B hip abd 4/5.     ASSESSMENT/PLAN  Updated problem list and treatment plan: Diagnosis 1:  B knee pain  Pain -  hot/cold therapy, manual therapy, splint/taping/bracing/orthotics, education and home program  Decreased ROM/flexibility - manual therapy and therapeutic exercise  Decreased joint mobility - manual therapy and therapeutic exercise  Decreased strength - therapeutic exercise and therapeutic activities  Impaired balance - neuro re-education and therapeutic activities  Decreased proprioception - neuro re-education and therapeutic activities  Edema - cold therapy  Impaired gait - gait training  Impaired muscle performance - neuro re-education  Decreased function - therapeutic activities  Impaired posture - neuro re-education    STG/LTGs have been met or progress has been made  "towards goals:  Yes (See Goal flow sheet completed today.)  Assessment of Progress: The patient's condition has potential to improve.  Self Management Plans:  Patient has been instructed in a home treatment program.  PATIENTS NAME:  Ekaterina Delcid   : 1950    I have re-evaluated this patient and find that the nature, scope, duration and intensity of the therapy is appropriate for the medical condition of the patient.  Ekaterina continues to require the following intervention to meet STG and LTG's:  PT    Recommendations:  This patient would benefit from continued therapy.     Frequency:  2 X a month, once daily  Duration:  for 2 months                Caregiver Signature/Credentials _____________________________ Date ________             Roscoe Gorman DPT   I have reviewed and certified the need for these services and plan of treatment while under my care.        PHYSICIAN'S SIGNATURE:   _____________________________________  Date___________              Garo Urena MD    Certification period:  Beginning of Cert date period: 10/04/17 to  End of Cert period date: 18     Functional Level Progress Report: Please see attached \"Goal Flow sheet for Functional level.\"    ____X____ Continue Services or       ________ DC Services                Service dates: From  SOC Date: 17 date to present                         "

## 2017-10-04 NOTE — LETTER
DEPARTMENT OF HEALTH AND HUMAN SERVICES  CENTERS FOR MEDICARE & MEDICAID SERVICES    PLAN/UPDATED PLAN OF PROGRESS FOR OUTPATIENT REHABILITATION    PATIENTS NAME:  Ekaterina Delcid   : 1950  PROVIDER NUMBER:    [id number]  [id number]  PROVIDER NAME: [place] [facility] PHYSICAL [facility]  MEDICAL RECORD NUMBER: [id number]   START OF CARE DATE:  SOC Date: 17   TYPE:  PT  PRIMARY/TREATMENT DIAGNOSIS: (Pertinent Medical Diagnosis)  Left knee pain  Right knee pain  VISITS FROM START OF CARE:  10  Rxs Used: 10     PROGRESS  REPORT    Progress reporting period is from 17 to 10/4/17.       SUBJECTIVE  Subjective changes noted by patient:  Subjective: Pt states that she is pretty flared up today with general soreness (hands, wrists, ankles, knees). Both knees are sore today (R>L).    Current Pain level: 4/10.     Initial Pain level: 5/10.   Changes in function:  None  Adverse reaction to treatment or activity: None    OBJECTIVE  Changes noted in objective findings:  Yes,   Objective: R knee 1+ effusion. R knee ERP with ext and flex. Good QS. SLR w/o lag. 30 flex SLR with BW, before fatigue. B hip abd 4/5.     ASSESSMENT/PLAN  Updated problem list and treatment plan: Diagnosis 1:  B knee pain  Pain -  hot/cold therapy, manual therapy, splint/taping/bracing/orthotics, education and home program  Decreased ROM/flexibility - manual therapy and therapeutic exercise  Decreased joint mobility - manual therapy and therapeutic exercise  Decreased strength - therapeutic exercise and therapeutic activities  Impaired balance - neuro re-education and therapeutic activities  Decreased proprioception - neuro re-education and therapeutic activities  Edema - cold therapy  Impaired gait - gait training  Impaired muscle performance - neuro re-education  Decreased function - therapeutic activities  Impaired posture - neuro re-education    STG/LTGs have been met or progress has been made  "towards goals:  Yes (See Goal flow sheet completed today.)  Assessment of Progress: The patient's condition has potential to improve.  PATIENTS NAME:  Ekaterina Delcid   : 1950    Self Management Plans:  Patient has been instructed in a home treatment program.  I have re-evaluated this patient and find that the nature, scope, duration and intensity of the therapy is appropriate for the medical condition of the patient.  Ekaterina continues to require the following intervention to meet STG and LTG's:  PT    Recommendations:  This patient would benefit from continued therapy.     Frequency:  2 X a month, once daily  Duration:  for 2 months                  Caregiver Signature/Credentials _____________________________ Date ________              Roscoe Gorman DPT   I have reviewed and certified the need for these services and plan of treatment while under my care.        PHYSICIAN'S SIGNATURE:   _____________________________________  Date___________              Garo Urena MD    Certification period:  Beginning of Cert date period: 17 to  End of Cert period date: 10/10/17     Functional Level Progress Report: Please see attached \"Goal Flow sheet for Functional level.\"    ____X____ Continue Services or       ________ DC Services                Service dates: From  SOC Date: 17 date to present                         "

## 2017-11-30 ENCOUNTER — TRANSFERRED RECORDS (OUTPATIENT)
Dept: HEALTH INFORMATION MANAGEMENT | Facility: CLINIC | Age: 67
End: 2017-11-30

## 2018-04-20 ASSESSMENT — MIFFLIN-ST. JEOR: SCORE: 1363.22

## 2018-04-24 ENCOUNTER — SURGERY - HEALTHEAST (OUTPATIENT)
Dept: SURGERY | Facility: CLINIC | Age: 68
End: 2018-04-24

## 2018-04-24 ENCOUNTER — ANESTHESIA - HEALTHEAST (OUTPATIENT)
Dept: SURGERY | Facility: CLINIC | Age: 68
End: 2018-04-24

## 2018-04-24 ASSESSMENT — MIFFLIN-ST. JEOR: SCORE: 1372.3

## 2018-04-25 ENCOUNTER — HOME CARE/HOSPICE - HEALTHEAST (OUTPATIENT)
Dept: HOME HEALTH SERVICES | Facility: HOME HEALTH | Age: 68
End: 2018-04-25

## 2018-04-28 ENCOUNTER — HOME CARE/HOSPICE - HEALTHEAST (OUTPATIENT)
Dept: HOME HEALTH SERVICES | Facility: HOME HEALTH | Age: 68
End: 2018-04-28

## 2018-04-28 ASSESSMENT — MIFFLIN-ST. JEOR: SCORE: 1372.3

## 2018-04-29 ENCOUNTER — COMMUNICATION - HEALTHEAST (OUTPATIENT)
Dept: HOME HEALTH SERVICES | Facility: HOME HEALTH | Age: 68
End: 2018-04-29

## 2018-04-29 ENCOUNTER — HOME CARE/HOSPICE - HEALTHEAST (OUTPATIENT)
Dept: HOME HEALTH SERVICES | Facility: HOME HEALTH | Age: 68
End: 2018-04-29

## 2018-05-01 ENCOUNTER — HOME CARE/HOSPICE - HEALTHEAST (OUTPATIENT)
Dept: HOME HEALTH SERVICES | Facility: HOME HEALTH | Age: 68
End: 2018-05-01

## 2018-05-03 ENCOUNTER — HOME CARE/HOSPICE - HEALTHEAST (OUTPATIENT)
Dept: HOME HEALTH SERVICES | Facility: HOME HEALTH | Age: 68
End: 2018-05-03

## 2018-05-04 ENCOUNTER — HOME CARE/HOSPICE - HEALTHEAST (OUTPATIENT)
Dept: HOME HEALTH SERVICES | Facility: HOME HEALTH | Age: 68
End: 2018-05-04

## 2018-05-07 ENCOUNTER — HOME CARE/HOSPICE - HEALTHEAST (OUTPATIENT)
Dept: HOME HEALTH SERVICES | Facility: HOME HEALTH | Age: 68
End: 2018-05-07

## 2018-05-09 ENCOUNTER — HOME CARE/HOSPICE - HEALTHEAST (OUTPATIENT)
Dept: HOME HEALTH SERVICES | Facility: HOME HEALTH | Age: 68
End: 2018-05-09

## 2018-05-11 ENCOUNTER — HOME CARE/HOSPICE - HEALTHEAST (OUTPATIENT)
Dept: HOME HEALTH SERVICES | Facility: HOME HEALTH | Age: 68
End: 2018-05-11

## 2018-05-18 ENCOUNTER — THERAPY VISIT (OUTPATIENT)
Dept: PHYSICAL THERAPY | Facility: CLINIC | Age: 68
End: 2018-05-18
Payer: MEDICARE

## 2018-05-18 DIAGNOSIS — Z47.1: ICD-10-CM

## 2018-05-18 DIAGNOSIS — Z96.651 STATUS POST TOTAL RIGHT KNEE REPLACEMENT: ICD-10-CM

## 2018-05-18 DIAGNOSIS — M25.622 STIFFNESS OF ELBOW JOINT, LEFT: Primary | ICD-10-CM

## 2018-05-18 DIAGNOSIS — M25.561 RIGHT KNEE PAIN: ICD-10-CM

## 2018-05-18 DIAGNOSIS — Z96.629: ICD-10-CM

## 2018-05-18 DIAGNOSIS — R60.9 EDEMA: ICD-10-CM

## 2018-05-18 DIAGNOSIS — R26.9 ABNORMAL GAIT: Primary | ICD-10-CM

## 2018-05-18 PROBLEM — M25.562 LEFT KNEE PAIN: Status: RESOLVED | Noted: 2017-06-19 | Resolved: 2018-05-18

## 2018-05-18 PROCEDURE — 97161 PT EVAL LOW COMPLEX 20 MIN: CPT | Mod: GP | Performed by: PHYSICAL THERAPIST

## 2018-05-18 PROCEDURE — G8978 MOBILITY CURRENT STATUS: HCPCS | Mod: GP | Performed by: PHYSICAL THERAPIST

## 2018-05-18 PROCEDURE — 97110 THERAPEUTIC EXERCISES: CPT | Mod: GP | Performed by: PHYSICAL THERAPIST

## 2018-05-18 PROCEDURE — G8979 MOBILITY GOAL STATUS: HCPCS | Mod: GP | Performed by: PHYSICAL THERAPIST

## 2018-05-18 ASSESSMENT — ACTIVITIES OF DAILY LIVING (ADL)
WEAKNESS: THE SYMPTOM AFFECTS MY ACTIVITY SLIGHTLY
STIFFNESS: I HAVE THE SYMPTOM BUT IT DOES NOT AFFECT MY ACTIVITY
LIMPING: I HAVE THE SYMPTOM BUT IT DOES NOT AFFECT MY ACTIVITY
HOW_WOULD_YOU_RATE_THE_CURRENT_FUNCTION_OF_YOUR_KNEE_DURING_YOUR_USUAL_DAILY_ACTIVITIES_ON_A_SCALE_FROM_0_TO_100_WITH_100_BEING_YOUR_LEVEL_OF_KNEE_FUNCTION_PRIOR_TO_YOUR_INJURY_AND_0_BEING_THE_INABILITY_TO_PERFORM_ANY_OF_YOUR_USUAL_DAILY_ACTIVITIES?: 50
PAIN: THE SYMPTOM AFFECTS MY ACTIVITY MODERATELY
SQUAT: ACTIVITY IS FAIRLY DIFFICULT
SIT WITH YOUR KNEE BENT: ACTIVITY IS MINIMALLY DIFFICULT
GO DOWN STAIRS: ACTIVITY IS SOMEWHAT DIFFICULT
RAW_SCORE: 46
WALK: ACTIVITY IS SOMEWHAT DIFFICULT
KNEE_ACTIVITY_OF_DAILY_LIVING_SUM: 46
HOW_WOULD_YOU_RATE_THE_OVERALL_FUNCTION_OF_YOUR_KNEE_DURING_YOUR_USUAL_DAILY_ACTIVITIES?: ABNORMAL
GIVING WAY, BUCKLING OR SHIFTING OF KNEE: I DO NOT HAVE THE SYMPTOM
KNEE_ACTIVITY_OF_DAILY_LIVING_SCORE: 65.71
GO UP STAIRS: ACTIVITY IS MINIMALLY DIFFICULT
SWELLING: I HAVE THE SYMPTOM BUT IT DOES NOT AFFECT MY ACTIVITY
KNEEL ON THE FRONT OF YOUR KNEE: I AM UNABLE TO DO THE ACTIVITY
AS_A_RESULT_OF_YOUR_KNEE_INJURY,_HOW_WOULD_YOU_RATE_YOUR_CURRENT_LEVEL_OF_DAILY_ACTIVITY?: ABNORMAL
STAND: ACTIVITY IS MINIMALLY DIFFICULT
RISE FROM A CHAIR: ACTIVITY IS MINIMALLY DIFFICULT

## 2018-05-18 NOTE — LETTER
DEPARTMENT OF HEALTH AND HUMAN SERVICES  CENTERS FOR MEDICARE & MEDICAID SERVICES    PLAN/UPDATED PLAN OF PROGRESS FOR OUTPATIENT REHABILITATION    PATIENTS NAME:  Ekaterina Delcid   : 1950  PROVIDER NUMBER:    [id number]  [id number]   PROVIDER NAME: [place] [facility] PHYSICAL THERAPY  MEDICAL RECORD NUMBER: [id number]   START OF CARE DATE:  SOC Date: 18   TYPE:  PT  PRIMARY/TREATMENT DIAGNOSIS: (Pertinent Medical Diagnosis)     Stiffness of elbow joint, left  Aftercare following elbow joint replacement surgery    VISITS FROM START OF CARE:  Rxs Used: 1     Physical Therapy Initial Evaluation   18  Dalila Peterson/Noah/MD instructions: R knee and L elbow x 12 visits (over 6 weeks) until f/u   Therapist Impression:   Pt is a 66 y/o female, with 3 month history of L elbow pain/stiffness after radial head replacement, rupture of L radial collateral ligament . Pt presents with pain, reduced ROM, weakness, reduced soft tissue mobility consistent with post op status. These impairments limit their ability to reach, dress, perform ADL's, and lift. Skilled PT services necessary in order to reduce impairments and improve independent function.  Subjective:   Chief Complaint: Pt reports that she was chasing her grandchild in February and fell onto L elbow. Ended up having a radial head replacement. Notes that she did therapy for a couple of months down in [place] but hasn't been able to get her mobility. Notes that reaching is improving but she is still limited in reaching and lifting things.  DOI/onset: 18 DOS: 18  Location: L olecranon Quality: achy with use- more stiffness  Frequency: with lifting Radiates: nil  Pain scale: Rest 0/10 Activity 4/10   Time of day: with activity Sleeping: no issues with the elbow (limited by knee)   Exacerbated by: reaching, stretching Relieved by: ice Progression: improved overall  Previous Treatment: PT  back in [place] Effect of prior treatment: helpful with improving mobility   PMH and/or surgical history: OA, osteopenia, melanoma, overweight, implanted device, thyroid problems, heart problems, anemia, pain at night/rest   PATIENTS NAME:  Ekaterina Delcid   : 1950  Imaging: x ray    Occupation: retired Job duties: household duties   Current HEP/exercise regimen: none currently d/t knee replacement Patient's goals: return to normal activity - would like to be able to be active and lose weight  Medications: cardiac, thyroid, pain  General health as reported by patient: good  Return to MD: PRN    Objective:  Elbow Initial Evaluation:    Posture: FHON, forward shoulder, L elbow rests in flexion    Elbow/Forearm AROM:   Ext/Flex Pronation Supination   Left 0- WNL ERP   Right 0-152 WNL WNL     Elbow Strength:  MMT Extension Flexion Pronation Supination   Left 5/5 5/5 5/5 4+/5   Right 5/5 5/5 5/5 5/5     Assessment/Plan:    Patient is a 67 year old female with left side elbow complaints.    Patient has the following significant findings with corresponding treatment plan.                Diagnosis 1:  S/p L radial head replacement  Pain -  hot/cold therapy, manual therapy, splint/taping/bracing/orthotics, education, directional preference exercise and home program  Decreased ROM/flexibility - manual therapy and therapeutic exercise  Decreased joint mobility - manual therapy and therapeutic exercise  Decreased strength - therapeutic exercise and therapeutic activities  Decreased proprioception - neuro re-education and therapeutic activities  Impaired muscle performance - neuro re-education  Decreased function - therapeutic activities  Impaired posture - neuro re-education    Therapy Evaluation Codes:   1) History comprised of:   Personal factors that impact the plan of care:      Past/current experiences and Time since onset of symptoms.    Comorbidity factors that impact the plan of care are:      Cancer,  Heart problems, High blood pressure, Implanted device, Osteoarthritis,   Overweight, Pain at night/rest and Weakness.    PATIENTS NAME:  Ekaterina Delcid   : 1950   Medications impacting care: Cardiac and Pain.  2) Examination of Body Systems comprised of:   Body structures and functions that impact the plan of care:      Elbow.   Activity limitations that impact the plan of care are:      Cooking, Dressing, Lifting and Sleeping.  3) Clinical presentation characteristics are:   Stable/Uncomplicated.  4) Decision-Making    Low complexity using standardized patient assessment instrument and/or   measureable assessment of functional outcome.  Cumulative Therapy Evaluation is: Low complexity.    Previous and current functional limitations:  (See Goal Flow Sheet for this information)    Short term and Long term goals: (See Goal Flow Sheet for this information)     Communication ability:  Patient appears to be able to clearly communicate and understand verbal and written communication and follow directions correctly.  Treatment Explanation - The following has been discussed with the patient:   RX ordered/plan of care  Anticipated outcomes  Possible risks and side effects  This patient would benefit from PT intervention to resume normal activities.   Rehab potential is good.    Frequency:  1 X week, once daily  Duration:  for 3 weeks tapering to 2 X a month over 6 weeks  Discharge Plan:  Achieve all LTG.  Independent in home treatment program.  Reach maximal therapeutic benefit.    Please refer to the daily flowsheet for treatment today, total treatment time and time spent performing 1:1 timed codes.       Caregiver Signature/Credentials _____________________________ Date ________       Roscoe Gorman DPT   I have reviewed and certified the need for these services and plan of treatment while under my care.        PHYSICIAN'S SIGNATURE:   ______________________________________ Date___________     Dalila Munson  "PA-C    Certification period:  Beginning of Cert date period: 05/18/18 to  End of Cert period date: 08/15/18   Functional Level Progress Report: Please see attached \"Goal Flow sheet for Functional level.\"  ____X____ Continue Services or       ________ DC Services              Service dates: From  SOC Date: 05/18/18 date to present                         "

## 2018-05-18 NOTE — LETTER
DEPARTMENT OF HEALTH AND HUMAN SERVICES  CENTERS FOR MEDICARE & MEDICAID SERVICES    PLAN/UPDATED PLAN OF PROGRESS FOR OUTPATIENT REHABILITATION    PATIENTS NAME:  Ekaterina Delcid   : 1950    PROVIDER NUMBER:    [id number]    [facility]    PROVIDER NAME: [place] [facility]ICAL THERAPY    MEDICAL RECORD NUMBER: [id number]     START OF CARE DATE:  SOC Date: 18   TYPE:  PT    PRIMARY/TREATMENT DIAGNOSIS: (Pertinent Medical Diagnosis)     Abnormal gait  Right knee pain  Edema  Status post total right knee replacement    VISITS FROM START OF CARE:  Rxs Used: 1     [facility] Initial Evaluation  Subjective:  Physical Therapy Initial Evaluation   18  Garo Urena MD Precautions/Restrictions/MD instructions: s/p R TKA   Therapist Impression:   Pt is a 66 y/o female, 3 wks s/p R TKA. Pt presents with pain, reduced ROM, weakness, gait abnormalities, effusion consistent with post op status. These impairments limit their ability to ambulate, navigate stairs, perform ADL's, and play with grandchildren. Skilled PT services necessary in order to reduce impairments and improve independent function.    Subjective:   Chief Complaint: Pt 3 weeks s/p R TKA. Was in hospital for two days after surgery and subsequently went home. Has stairs w/ railings at home. Currently having anterior knee pain as well as some lateral thigh/IT band aching with standing/walking/stairs/transfers. Notes that she really hasn't used an AD for about a week now  DOI/onset: chronic R knee pain DOS: 18  Location: see above Quality: generally achy, low grade  PATIENTS NAME:  Ekaterina Delcid   : 1950    Frequency: with weight bearing and with stretching Radiates: nil  Pain scale: Rest 2/10 Activity 5/10   Time of day: with activity or exercise Sleeping: no issues currenlty   Exacerbated by: see above Relieved by: ice, medication Progression: better each  week  Previous Treatment: some PT in hospital Effect of prior treatment: helpful with mobility   PMH and/or surgical history: OA, osteopenia, melanoma, overweight, implanted device, thyroid problems, heart problems, anemia, pain at night/rest   Imaging: x ray    Occupation: retired Job duties: household duties   Current HEP/exercise regimen: none currently d/t knee replacement Patient's goals: return to normal activity - would like to be able to be active and lose weight  Medications: cardiac, thyroid, pain  General health as reported by patient: good  Return to MD: PRN    HPI                    Objective:  POST-OPERATIVE KNEE EVALUATION   Gait: Does not achieve TKE     Integument: incision healing well      AROM   Left  0-130   Right  0-5-102     Muscle Activation Quad Set  Straight Leg Raise    Left  good W/o lag   Right  good 5 deg lag      PATIENTS NAME:  Ekaterina Delcid   : 1950    R knee: 2+ effusion    Assessment/Plan:    Patient is a 67 year old female with left side knee complaints.    Patient has the following significant findings with corresponding treatment plan.                Diagnosis 1:  S/p R TKA  Pain -  hot/cold therapy, manual therapy, splint/taping/bracing/orthotics, education and home program  Decreased ROM/flexibility - manual therapy and therapeutic exercise  Decreased joint mobility - manual therapy and therapeutic exercise  Decreased strength - therapeutic exercise and therapeutic activities  Impaired balance - neuro re-education and therapeutic activities  Decreased proprioception - neuro re-education and therapeutic activities  Edema - vasopneumatics and cold therapy  Impaired gait - gait training and assistive devices  Impaired muscle performance - electric stimulation and neuro re-education  Decreased function - therapeutic activities and home program    Therapy Evaluation Codes:   1) History comprised of:   Personal factors that impact the plan of care:      Past/current  experiences and Time since onset of symptoms.    Comorbidity factors that impact the plan of care are:      Cancer, Chemical Dependency, Heart problems, High blood pressure,  Osteoarthritis, Overweight, Pain at night/rest and Weakness.     Medications impacting care: Cardiac, Pain and thyroid.  2) Examination of Body Systems comprised of:   Body structures and functions that impact the plan of care:      Knee.   Activity limitations that impact the plan of care are:      Bending, Cooking, Driving, Dressing, Lifting, Sitting, Squatting/kneeling, Stairs,  Standing, Walking and Sleeping.  3) Clinical presentation characteristics are:   Stable/Uncomplicated.  4) Decision-Making    Low complexity using standardized patient assessment instrument and/or  measureable assessment of functional outcome.  Cumulative Therapy Evaluation is: Low complexity.    Previous and current functional limitations:  (See Goal Flow Sheet for this information)    Short term and Long term goals: (See Goal Flow Sheet for this information)   Communication ability:  Patient appears to be able to clearly communicate and understand verbal and written communication and follow directions correctly.  Treatment Explanation - The following has been discussed with the patient:   RX ordered/plan of care  Anticipated outcomes  Possible risks and side effects  This patient would benefit from PT intervention to resume normal activities.   Rehab potential is excellent.  PATIENTS NAME:  Ekaterina Delcid   : 1950      Frequency:  1 X week, once daily  Duration:  for 8 weeks, reducing to 2x/month for 1 month  Discharge Plan:  Achieve all LTG.  Independent in home treatment program.  Reach maximal therapeutic benefit.    Please refer to the daily flowsheet for treatment today, total treatment time and time spent performing 1:1 timed codes.         Caregiver Signature/Credentials _______________________________ Date ________      Treating Provider: Roscoe  "TOOTIE Gorman     I have reviewed and certified the need for these services and plan of treatment while under my care.        PHYSICIAN'S SIGNATURE:   _________________________________________  Date___________   Dalila Munson PA-C    Certification period:  Beginning of Cert date period: 05/18/18 to  End of Cert period date: 08/15/18     Functional Level Progress Report: Please see attached \"Goal Flow sheet for Functional level.\"    ____X____ Continue Services or       ________ DC Services                Service dates: From  SOC Date: 05/18/18 date to present                         "

## 2018-05-18 NOTE — LETTER
DEPARTMENT OF HEALTH AND HUMAN SERVICES  CENTERS FOR MEDICARE & MEDICAID SERVICES    PLAN/UPDATED PLAN OF PROGRESS FOR OUTPATIENT REHABILITATION    PATIENTS NAME:  Ekaterina Delcid   : 1950  PROVIDER NUMBER:    [id number]  [id number]   PROVIDER NAME: [facility] PHYSICAL THERAPY  MEDICAL RECORD NUMBER: [id number]   START OF CARE DATE:  SOC Date: 18   TYPE:  PT    PRIMARY/TREATMENT DIAGNOSIS: (Pertinent Medical Diagnosis)     Abnormal gait  Right knee pain  Edema  Status post total right knee replacement    VISITS FROM START OF CARE:  Rxs Used: 1   Physical Therapy Initial Evaluation   18  Garo Urena MD Precautions/Restrictions/MD instructions: s/p R TKA   Therapist Impression:   Pt is a 66 y/o female, 3 wks s/p R TKA. Pt presents with pain, reduced ROM, weakness, gait abnormalities, effusion consistent with post op status. These impairments limit their ability to ambulate, navigate stairs, perform ADL's, and play with grandchildren. Skilled PT services necessary in order to reduce impairments and improve independent function.  Subjective:   Chief Complaint: Pt 3 weeks s/p R TKA. Was in hospital for two days after surgery and subsequently went home. Has stairs w/ railings at home. Currently having anterior knee pain as well as some lateral thigh/IT band aching with standing/walking/stairs/transfers. Notes that she really hasn't used an AD for about a week now  DOI/onset: chronic R knee pain DOS: 18  Location: see above Quality: generally achy, low grade  Frequency: with weight bearing and with stretching Radiates: nil  Pain scale: Rest 2/10 Activity 5/10   Time of day: with activity or exercise Sleeping: no issues currenlty   Exacerbated by: see above Relieved by: ice, medication Progression: better each week  Previous Treatment: some PT in hospital Effect of prior treatment: helpful with mobility   PMH and/or surgical history: OA, osteopenia,  melanoma, overweight, implanted device, thyroid problems, heart problems, anemia, pain at night/rest   PATIENTS NAME:  Ekaterina Delcid   : 1950  Imaging: x ray    Occupation: retired Job duties: household duties   Current HEP/exercise regimen: none currently d/t knee replacement Patient's goals: return to normal activity - would like to be able to be active and lose weight  Medications: cardiac, thyroid, pain  General health as reported by patient: good  Return to MD: PRN              Objective:  POST-OPERATIVE KNEE EVALUATION   Gait: Does not achieve TKE   Integument: incision healing well      AROM   Left  0-130   Right  0-5-102     Muscle Activation Quad Set  Straight Leg Raise    Left  good W/o lag   Right  good 5 deg lag    R knee: 2+ effusion    Assessment/Plan:    Patient is a 67 year old female with left side knee complaints.    Patient has the following significant findings with corresponding treatment plan.                Diagnosis 1:  S/p R TKA  Pain -  hot/cold therapy, manual therapy, splint/taping/bracing/orthotics, education and home program  Decreased ROM/flexibility - manual therapy and therapeutic exercise  Decreased joint mobility - manual therapy and therapeutic exercise  Decreased strength - therapeutic exercise and therapeutic activities  Impaired balance - neuro re-education and therapeutic activities  Decreased proprioception - neuro re-education and therapeutic activities  Edema - vasopneumatics and cold therapy  Impaired gait - gait training and assistive devices  Impaired muscle performance - electric stimulation and neuro re-education  Decreased function - therapeutic activities and home program  PATIENTS NAME:  Ekaterina Delcid   : 1950    Therapy Evaluation Codes:   1) History comprised of:   Personal factors that impact the plan of care:      Past/current experiences and Time since onset of symptoms.    Comorbidity factors that impact the plan of care are:       Cancer, Chemical Dependency, Heart problems, High blood pressure,    Osteoarthritis, Overweight, Pain at night/rest and Weakness.     Medications impacting care: Cardiac, Pain and thyroid.  2) Examination of Body Systems comprised of:   Body structures and functions that impact the plan of care:      Knee.   Activity limitations that impact the plan of care are:      Bending, Cooking, Driving, Dressing, Lifting, Sitting, Squatting/kneeling, Stairs,   Standing, Walking and Sleeping.  3) Clinical presentation characteristics are:   Stable/Uncomplicated.  4) Decision-Making    Low complexity using standardized patient assessment instrument and/or   measureable assessment of functional outcome.    Cumulative Therapy Evaluation is: Low complexity.    Previous and current functional limitations:  (See Goal Flow Sheet for this information)    Short term and Long term goals: (See Goal Flow Sheet for this information)     Communication ability:  Patient appears to be able to clearly communicate and understand verbal and written communication and follow directions correctly.  Treatment Explanation - The following has been discussed with the patient:   RX ordered/plan of care  Anticipated outcomes  Possible risks and side effects  This patient would benefit from PT intervention to resume normal activities.   Rehab potential is excellent.    Frequency:  1 X week, once daily  Duration:  for 8 weeks, reducing to 2x/month for 1 month  Discharge Plan:  Achieve all LTG.  Independent in home treatment program.  Reach maximal therapeutic benefit.                        PATIENTS NAME:  Ekaterina Delcid   : 1950    Please refer to the daily flowsheet for treatment today, total treatment time and time spent performing 1:1 timed codes.         Caregiver Signature/Credentials _____________________________ Date ________        Roscoe Gorman DPT   I have reviewed and certified the need for these services and plan of treatment  "while under my care.        PHYSICIAN'S SIGNATURE:   ______________________________________ Date___________     Dalila Munson PA-C    Certification period:  Beginning of Cert date period: 05/18/18 to  End of Cert period date: 08/15/18     Functional Level Progress Report: Please see attached \"Goal Flow sheet for Functional level.\"    ____X____ Continue Services or       ________ DC Services                Service dates: From  SOC Date: 05/18/18 date to present                         "

## 2018-05-18 NOTE — MR AVS SNAPSHOT
After Visit Summary   5/18/2018    Ekaterina Delcid    MRN: [id number]           Patient Information     Date Of Birth          1950        Visit Information        Provider Department      5/18/2018 9:00 AM Roscoe Gorman PT [facility] Physical Therapy        Today's Diagnoses     Stiffness of elbow joint, left    -  1    Aftercare following elbow joint replacement surgery           Follow-ups after your visit        Your next 10 appointments already scheduled     May 21, 2018  8:50 AM CDT   EMIR Extremity with Roscoe Gorman PT   [facility] Physical Therapy ([facility]  )    [address] [place] [address]   [phone]            May 21, 2018  9:30 AM CDT   EMIR Extremity with Roscoe Gorman PT   [facility] Physical Therapy ([facility]  )    [address] [place] [address]   [phone]            May 24, 2018  9:30 AM CDT   EMIR Extremity with Roscoe Gorman PT   [facility] ([facility]  )    [address] [place] [address]   [phone]            May 24, 2018 10:10 AM CDT   EMIR Extremity with Roscoe Gorman PT   [facility] Physical Therapy ([facility]  )    [address] [place] [address]   [phone]            May 30, 2018  8:50 AM CDT   EMIR Extremity with Roscoe Gorman PT   [facility] Physical Therapy ([facility]  )    [address] [place] [address]   [phone]            Jun 07, 2018  9:30 AM CDT   EMIR Extremity with Roscoe Gorman PT   [facility] Physical Therapy ([facility]  )    [address] [place] [address]   [phone]              Who to contact     If you have questions or need follow up information about today's clinic  "visit or your schedule please contact [facility] PHYSICAL THERAPY directly at [phone].  Normal or non-critical lab and imaging results will be communicated to you by [employer], letter or phone within 4 business days after the clinic has received the results. If you do not hear from us within 7 days, please contact the clinic through [employer] or phone. If you have a critical or abnormal lab result, we will notify you by phone as soon as possible.  Submit refill requests through [employer] or call your pharmacy and they will forward the refill request to us. Please allow 3 business days for your refill to be completed.          Additional Information About Your Visit        [employer] Information     [employer] lets you send messages to your doctor, view your test results, renew your prescriptions, schedule appointments and more. To sign up, go to [email or web address] . Click on \"Log in\" on the left side of the screen, which will take you to the Welcome page. Then click on \"Sign up Now\" on the right side of the page.     You will be asked to enter the access code listed below, as well as some personal information. Please follow the directions to create your username and password.     Your access code is: [email or web address]  Expires: 2018 10:14 AM     Your access code will  in 90 days. If you need help or a new code, please call your [place] clinic or [phone].        Care EveryWhere ID     This is your Care EveryWhere ID. This could be used by other organizations to access your [place] medical records  [id number]         Blood Pressure from Last 3 Encounters:   05/19/15 133/89   11/10/14 135/64   14 123/83    Weight from Last 3 Encounters:   14 72.8 kg (160 lb 8 oz)   14 73.5 kg (162 lb)   14 73.5 kg (162 lb)              We Performed the Following     HC PT EVAL, LOW COMPLEXITY     EMIR CERT REPORT     EMIR INITIAL EVAL REPORT     THERAPEUTIC " EXERCISES        Primary Care Provider Office Phone # Fax #    Mark Torres [phone]       [facility] [address] [facility] [address]  [facility] [address]        Equal Access to Services     KINGS MOREIRA : Hadii ihsan dobbins kvngo Solloydali, waaxda luqadaha, qaybta kaalmada adefranco, zafar yaonolvia crowe. So [facility] [phone].    ATENCIÓN: Si habla español, tiene a dobson disposición servicios gratuitos de asistencia lingüística. Llame al [phone].    We comply with applicable federal civil rights laws and [place] laws. We do not discriminate on the basis of race, color, national origin, age, disability, sex, sexual orientation, or gender identity.            Thank you!     Thank you for [facility] PHYSICAL THERAPY  for your care. Our goal is always to provide you with excellent care. Hearing back from our patients is one way we can continue to improve our services. Please take a few minutes to complete the written survey that you may receive in the mail after your visit with us. Thank you!             Your Updated Medication List - Protect others around you: Learn how to safely use, store and throw away your medicines at www.disposemymeds.org.          This list is accurate as of 5/18/18 10:14 AM.  Always use your most recent med list.                   Brand Name Dispense Instructions for use Diagnosis    ASPIRIN PO      Take 81 mg by mouth daily        ATORVASTATIN CALCIUM PO      Take 40 mg by mouth daily        BUSPIRONE HCL PO      Take 30 mg by mouth 2 times daily        carboxymethylcellulose 0.5 % Soln ophthalmic solution    REFRESH PLUS    1 Bottle    Place 1 drop into both eyes 3 times daily as needed for dry eyes    Dry eyes, bilateral       COENZYME Q-10 PO      Take 100 mg by mouth daily         MG Caps      Take 3 capsules by mouth daily        DHA-EPA-Vit B6-B12-Folic Acid Caps      Take 1 capsule by  mouth daily        diclofenac 1 % Gel topical gel    VOLTAREN    100 g    Apply 4 grams to knees or 2 grams to hands four times daily using enclosed dosing card.    Generalized osteoarthrosis, unspecified site       gabapentin 300 MG capsule    NEURONTIN    120 capsule    Take 1 capsule (300 mg) by mouth 4 times daily    Chronic pain       LEVOTHYROXINE SODIUM PO      Take 0.125 mg by mouth 1 tablet 6 days per week and 1/2 tablet the 7th day        LISINOPRIL PO      Take 5 mg by mouth daily        magnesium 100 MG Caps      Take 100 mg by mouth daily        METOPROLOL TARTRATE PO      Take 12.5 mg by mouth daily        MULTIVITAMIN PO      Take 2 capsules by mouth daily        NITROGLYCERIN SL      Place 0.4 mg under the tongue every 5 minutes as needed        TYLENOL PO      Take 500-1,000 mg by mouth every 4 hours as needed for mild pain or fever        VISTARIL PO      Take 50 mg by mouth 4 times daily        Vitamin D-3 1000 units Caps      Take 1 capsule by mouth daily        ZOLOFT 100 MG tablet   Generic drug:  sertraline      Take by mouth daily        zolpidem 6.25 MG CR tablet    AMBIEN CR    15 tablet    Take 1 tablet (6.25 mg) by mouth nightly as needed for sleep    Insomnia

## 2018-05-18 NOTE — PROGRESS NOTES
[facility] Initial Evaluation  Subjective:  Physical Therapy Initial Evaluation   5/18/18  Dalila Peterson/Noah/MD instructions: R knee and L elbow x 12 visits (over 6 weeks) until f/u   Therapist Impression:   Pt is a 68 y/o female, with 3 month history of L elbow pain/stiffness after radial head replacement, rupture of L radial collateral ligament . Pt presents with pain, reduced ROM, weakness, reduced soft tissue mobility consistent with post op status. These impairments limit their ability to reach, dress, perform ADL's, and lift. Skilled PT services necessary in order to reduce impairments and improve independent function.    Subjective:   Chief Complaint: Pt reports that she was chasing her grandchild in February and fell onto L elbow. Ended up having a radial head replacement. Notes that she did therapy for a couple of months down in [place] but hasn't been able to get her mobility. Notes that reaching is improving but she is still limited in reaching and lifting things.  DOI/onset: 2/22/18 DOS: 2/22/18  Location: L olecranon Quality: achy with use- more stiffness  Frequency: with lifting Radiates: nil  Pain scale: Rest 0/10 Activity 4/10   Time of day: with activity Sleeping: no issues with the elbow (limited by knee)   Exacerbated by: reaching, stretching Relieved by: ice Progression: improved overall  Previous Treatment: PT back in [place] Effect of prior treatment: helpful with improving mobility   PMH and/or surgical history: OA, osteopenia, melanoma, overweight, implanted device, thyroid problems, heart problems, anemia, pain at night/rest   Imaging: x ray    Occupation: retired Job duties: household duties   Current HEP/exercise regimen: none currently d/t knee replacement Patient's goals: return to normal activity - would like to be able to be active and lose weight  Medications: cardiac, thyroid, pain  General health as reported by patient: good  Return to  MD: PRN    hospitals                    Objective:  Elbow Initial Evaluation:    Posture: FHON, forward shoulder, L elbow rests in flexion    Elbow/Forearm AROM:   Ext/Flex Pronation Supination   Left 0- WNL ERP   Right 0-152 WNL WNL     Elbow Strength:  MMT Extension Flexion Pronation Supination   Left 5/5 5/5 5/5 4+/5   Right 5/5 5/5 5/5 5/5   System    Physical Exam    General     ROS    Assessment/Plan:    Patient is a 67 year old female with left side elbow complaints.    Patient has the following significant findings with corresponding treatment plan.                Diagnosis 1:  S/p L radial head replacement  Pain -  hot/cold therapy, manual therapy, splint/taping/bracing/orthotics, education, directional preference exercise and home program  Decreased ROM/flexibility - manual therapy and therapeutic exercise  Decreased joint mobility - manual therapy and therapeutic exercise  Decreased strength - therapeutic exercise and therapeutic activities  Decreased proprioception - neuro re-education and therapeutic activities  Impaired muscle performance - neuro re-education  Decreased function - therapeutic activities  Impaired posture - neuro re-education    Therapy Evaluation Codes:   1) History comprised of:   Personal factors that impact the plan of care:      Past/current experiences and Time since onset of symptoms.    Comorbidity factors that impact the plan of care are:      Cancer, Heart problems, High blood pressure, Implanted device, Osteoarthritis, Overweight, Pain at night/rest and Weakness.     Medications impacting care: Cardiac and Pain.  2) Examination of Body Systems comprised of:   Body structures and functions that impact the plan of care:      Elbow.   Activity limitations that impact the plan of care are:      Cooking, Dressing, Lifting and Sleeping.  3) Clinical presentation characteristics are:   Stable/Uncomplicated.  4) Decision-Making    Low complexity using standardized patient assessment  instrument and/or measureable assessment of functional outcome.  Cumulative Therapy Evaluation is: Low complexity.    Previous and current functional limitations:  (See Goal Flow Sheet for this information)    Short term and Long term goals: (See Goal Flow Sheet for this information)     Communication ability:  Patient appears to be able to clearly communicate and understand verbal and written communication and follow directions correctly.  Treatment Explanation - The following has been discussed with the patient:   RX ordered/plan of care  Anticipated outcomes  Possible risks and side effects  This patient would benefit from PT intervention to resume normal activities.   Rehab potential is good.    Frequency:  1 X week, once daily  Duration:  for 3 weeks tapering to 2 X a month over 6 weeks  Discharge Plan:  Achieve all LTG.  Independent in home treatment program.  Reach maximal therapeutic benefit.    Please refer to the daily flowsheet for treatment today, total treatment time and time spent performing 1:1 timed codes.

## 2018-05-18 NOTE — LETTER
DEPARTMENT OF HEALTH AND HUMAN SERVICES  [facility]ENTERS FOR MEDICARE & MEDICAID SERVICES    PLAN/UPDATED PLAN OF PROGRESS FOR OUTPATIENT REHABILITATION    PATIENTS NAME:  Ekaterina Delcid     : 1950    [facility] NUMBER:    [id number]    [facility]    PR[facility] NAME: [facility]    MEDICAL RECORD NUMBER: [id number]     START OF CARE DATE:  SOC Date: 18   TYPE:  PT    PRIMARY/TREATMENT DIAGNOSIS: (Pertinent Medical Diagnosis)     Abnormal gait  Right knee pain  Edema  Status post total right knee replacement    VISITS FROM START OF CARE:  Rxs Used: 1     [facility] Initial Evaluation  Subjective:  Physical Therapy Initial Evaluation   18  Garo Urena MD Precautions/Restrictions/MD instructions: s/p R TKA   Therapist Impression:   Pt is a 68 y/o female, 3 wks s/p R TKA. Pt presents with pain, reduced ROM, weakness, gait abnormalities, effusion consistent with post op status. These impairments limit their ability to ambulate, navigate stairs, perform ADL's, and play with grandchildren. Skilled PT services necessary in order to reduce impairments and improve independent function.    Subjective:   Chief Complaint: Pt 3 weeks s/p R TKA. Was in hospital for two days after surgery and subsequently went home. Has stairs w/ railings at home. Currently having anterior knee pain as well as some lateral thigh/IT band aching with standing/walking/stairs/transfers. Notes that she really hasn't used an AD for about a week now  DOI/onset: chronic R knee pain DOS: 18  PATIENTS NAME:  Ekaterina Delcid   : 1950    Location: see above Quality: generally achy, low grade  Frequency: with weight bearing and with stretching Radiates: nil  Pain scale: Rest 2/10 Activity 5/10   Time of day: with activity or exercise Sleeping: no issues currenlty   Exacerbated by: see above Relieved by: ice, medication Progression: better each  week  Previous Treatment: some PT in hospital Effect of prior treatment: helpful with mobility   PMH and/or surgical history: OA, osteopenia, melanoma, overweight, implanted device, thyroid problems, heart problems, anemia, pain at night/rest   Imaging: x ray    Occupation: retired Job duties: household duties   Current HEP/exercise regimen: none currently d/t knee replacement Patient's goals: return to normal activity - would like to be able to be active and lose weight  Medications: cardiac, thyroid, pain  General health as reported by patient: good  Return to MD: PRN    HPI                    Objective:  POST-OPERATIVE KNEE EVALUATION   Gait: Does not achieve TKE     Integument: incision healing well      AROM   Left  0-130   Right  0-5-102     Muscle Activation Quad Set  Straight Leg Raise    Left  good W/o lag   Right  good 5 deg lag    PATIENTS NAME:  Ekaterina Delcid   : 1950    R knee: 2+ effusion    Assessment/Plan:    Patient is a 67 year old female with left side knee complaints.    Patient has the following significant findings with corresponding treatment plan.                Diagnosis 1:  S/p R TKA  Pain -  hot/cold therapy, manual therapy, splint/taping/bracing/orthotics, education and home program  Decreased ROM/flexibility - manual therapy and therapeutic exercise  Decreased joint mobility - manual therapy and therapeutic exercise  Decreased strength - therapeutic exercise and therapeutic activities  Impaired balance - neuro re-education and therapeutic activities  Decreased proprioception - neuro re-education and therapeutic activities  Edema - vasopneumatics and cold therapy  Impaired gait - gait training and assistive devices  Impaired muscle performance - electric stimulation and neuro re-education  Decreased function - therapeutic activities and home program    Therapy Evaluation Codes:   1) History comprised of:   Personal factors that impact the plan of care:      Past/current  experiences and Time since onset of symptoms.    Comorbidity factors that impact the plan of care are:      Cancer, Chemical Dependency, Heart problems, High blood pressure,  Osteoarthritis, Overweight, Pain at night/rest and Weakness.     Medications impacting care: Cardiac, Pain and thyroid.  2) Examination of Body Systems comprised of:   Body structures and functions that impact the plan of care:      Knee.   Activity limitations that impact the plan of care are:      Bending, Cooking, Driving, Dressing, Lifting, Sitting, Squatting/kneeling, Stairs,  Standing, Walking and Sleeping.  3) Clinical presentation characteristics are:   Stable/Uncomplicated.  4) Decision-Making    Low complexity using standardized patient assessment instrument and/or measureable assessment of functional outcome.  Cumulative Therapy Evaluation is: Low complexity.    Previous and current functional limitations:  (See Goal Flow Sheet for this information)    Short term and Long term goals: (See Goal Flow Sheet for this information)     Communication ability:  Patient appears to be able to clearly communicate and understand verbal and written communication and follow directions correctly.  Treatment Explanation - The following has been discussed with the patient:   RX ordered/plan of care  Anticipated outcomes  Possible risks and side effects  This patient would benefit from PT intervention to resume normal activities.   Rehab potential is excellent.  PATIENTS NAME:  Ekaterina Delcid   : 1950        Frequency:  1 X week, once daily  Duration:  for 8 weeks, reducing to 2x/month for 1 month  Discharge Plan:  Achieve all LTG.  Independent in home treatment program.  Reach maximal therapeutic benefit.    Please refer to the daily flowsheet for treatment today, total treatment time and time spent performing 1:1 timed codes.         Caregiver Signature/Credentials _______________________________ Date ________      Treating Provider: Roscoe  "TOOTIE Gorman   I have reviewed and certified the need for these services and plan of treatment while under my care.        PHYSICIAN'S SIGNATURE:   _________________________________________  Date___________   Dalila Munson PA-C    Certification period:  Beginning of Cert date period: 05/18/18 to  End of Cert period date: 08/15/18     Functional Level Progress Report: Please see attached \"Goal Flow sheet for Functional level.\"    ____X____ Continue Services or       ________ DC Services                Service dates: From  SOC Date: 05/18/18 date to present                         "

## 2018-05-18 NOTE — PROGRESS NOTES
[facility] Initial Evaluation  Subjective:  Physical Therapy Initial Evaluation   5/18/18  Garo Urena MD Precautions/Restrictions/MD instructions: s/p R TKA   Therapist Impression:   Pt is a 66 y/o female, 3 wks s/p R TKA. Pt presents with pain, reduced ROM, weakness, gait abnormalities, effusion consistent with post op status. These impairments limit their ability to ambulate, navigate stairs, perform ADL's, and play with grandchildren. Skilled PT services necessary in order to reduce impairments and improve independent function.    Subjective:   Chief Complaint: Pt 3 weeks s/p R TKA. Was in hospital for two days after surgery and subsequently went home. Has stairs w/ railings at home. Currently having anterior knee pain as well as some lateral thigh/IT band aching with standing/walking/stairs/transfers. Notes that she really hasn't used an AD for about a week now  DOI/onset: chronic R knee pain DOS: 4/24/18  Location: see above Quality: generally achy, low grade  Frequency: with weight bearing and with stretching Radiates: nil  Pain scale: Rest 2/10 Activity 5/10   Time of day: with activity or exercise Sleeping: no issues currenlty   Exacerbated by: see above Relieved by: ice, medication Progression: better each week  Previous Treatment: some PT in hospital Effect of prior treatment: helpful with mobility   PMH and/or surgical history: OA, osteopenia, melanoma, overweight, implanted device, thyroid problems, heart problems, anemia, pain at night/rest   Imaging: x ray    Occupation: retired Job duties: household duties   Current HEP/exercise regimen: none currently d/t knee replacement Patient's goals: return to normal activity - would like to be able to be active and lose weight  Medications: cardiac, thyroid, pain  General health as reported by patient: good  Return to MD: PRN    HPI                    Objective:  POST-OPERATIVE KNEE EVALUATION   Gait: Does not achieve TKE     Integument:  incision healing well      AROM   Left  0-130   Right  0-5-102     Muscle Activation Quad Set  Straight Leg Raise    Left  good W/o lag   Right  good 5 deg lag    R knee: 2+ effusion    System    Physical Exam    General     ROS    Assessment/Plan:    Patient is a 67 year old female with left side knee complaints.    Patient has the following significant findings with corresponding treatment plan.                Diagnosis 1:  S/p R TKA  Pain -  hot/cold therapy, manual therapy, splint/taping/bracing/orthotics, education and home program  Decreased ROM/flexibility - manual therapy and therapeutic exercise  Decreased joint mobility - manual therapy and therapeutic exercise  Decreased strength - therapeutic exercise and therapeutic activities  Impaired balance - neuro re-education and therapeutic activities  Decreased proprioception - neuro re-education and therapeutic activities  Edema - vasopneumatics and cold therapy  Impaired gait - gait training and assistive devices  Impaired muscle performance - electric stimulation and neuro re-education  Decreased function - therapeutic activities and home program    Therapy Evaluation Codes:   1) History comprised of:   Personal factors that impact the plan of care:      Past/current experiences and Time since onset of symptoms.    Comorbidity factors that impact the plan of care are:      Cancer, Chemical Dependency, Heart problems, High blood pressure, Osteoarthritis, Overweight, Pain at night/rest and Weakness.     Medications impacting care: Cardiac, Pain and thyroid.  2) Examination of Body Systems comprised of:   Body structures and functions that impact the plan of care:      Knee.   Activity limitations that impact the plan of care are:      Bending, Cooking, Driving, Dressing, Lifting, Sitting, Squatting/kneeling, Stairs, Standing, Walking and Sleeping.  3) Clinical presentation characteristics are:   Stable/Uncomplicated.  4) Decision-Making    Low complexity using  standardized patient assessment instrument and/or measureable assessment of functional outcome.  Cumulative Therapy Evaluation is: Low complexity.    Previous and current functional limitations:  (See Goal Flow Sheet for this information)    Short term and Long term goals: (See Goal Flow Sheet for this information)     Communication ability:  Patient appears to be able to clearly communicate and understand verbal and written communication and follow directions correctly.  Treatment Explanation - The following has been discussed with the patient:   RX ordered/plan of care  Anticipated outcomes  Possible risks and side effects  This patient would benefit from PT intervention to resume normal activities.   Rehab potential is excellent.    Frequency:  1 X week, once daily  Duration:  for 8 weeks, reducing to 2x/month for 1 month  Discharge Plan:  Achieve all LTG.  Independent in home treatment program.  Reach maximal therapeutic benefit.    Please refer to the daily flowsheet for treatment today, total treatment time and time spent performing 1:1 timed codes.

## 2018-05-21 ENCOUNTER — THERAPY VISIT (OUTPATIENT)
Dept: PHYSICAL THERAPY | Facility: CLINIC | Age: 68
End: 2018-05-21
Payer: MEDICARE

## 2018-05-21 DIAGNOSIS — Z47.1: ICD-10-CM

## 2018-05-21 DIAGNOSIS — R60.9 EDEMA: ICD-10-CM

## 2018-05-21 DIAGNOSIS — Z96.629: ICD-10-CM

## 2018-05-21 DIAGNOSIS — M25.561 RIGHT KNEE PAIN: ICD-10-CM

## 2018-05-21 DIAGNOSIS — R26.9 ABNORMAL GAIT: ICD-10-CM

## 2018-05-21 DIAGNOSIS — Z96.651 STATUS POST TOTAL RIGHT KNEE REPLACEMENT: ICD-10-CM

## 2018-05-21 PROCEDURE — 97140 MANUAL THERAPY 1/> REGIONS: CPT | Mod: GP | Performed by: PHYSICAL THERAPIST

## 2018-05-21 PROCEDURE — 97110 THERAPEUTIC EXERCISES: CPT | Mod: GP | Performed by: PHYSICAL THERAPIST

## 2018-05-24 ENCOUNTER — THERAPY VISIT (OUTPATIENT)
Dept: PHYSICAL THERAPY | Facility: CLINIC | Age: 68
End: 2018-05-24
Payer: MEDICARE

## 2018-05-24 DIAGNOSIS — R26.9 ABNORMAL GAIT: ICD-10-CM

## 2018-05-24 DIAGNOSIS — M25.561 RIGHT KNEE PAIN: ICD-10-CM

## 2018-05-24 DIAGNOSIS — Z47.1: ICD-10-CM

## 2018-05-24 DIAGNOSIS — R60.9 EDEMA: ICD-10-CM

## 2018-05-24 DIAGNOSIS — Z96.651 STATUS POST TOTAL RIGHT KNEE REPLACEMENT: ICD-10-CM

## 2018-05-24 DIAGNOSIS — Z96.629: ICD-10-CM

## 2018-05-24 PROCEDURE — 97110 THERAPEUTIC EXERCISES: CPT | Mod: GP | Performed by: PHYSICAL THERAPIST

## 2018-05-24 PROCEDURE — 97140 MANUAL THERAPY 1/> REGIONS: CPT | Mod: GP | Performed by: PHYSICAL THERAPIST

## 2018-06-01 ENCOUNTER — THERAPY VISIT (OUTPATIENT)
Dept: PHYSICAL THERAPY | Facility: CLINIC | Age: 68
End: 2018-06-01
Payer: MEDICARE

## 2018-06-01 DIAGNOSIS — Z47.1: ICD-10-CM

## 2018-06-01 DIAGNOSIS — Z96.629: ICD-10-CM

## 2018-06-01 PROCEDURE — 97110 THERAPEUTIC EXERCISES: CPT | Mod: GP | Performed by: PHYSICAL THERAPIST

## 2018-06-01 PROCEDURE — 97140 MANUAL THERAPY 1/> REGIONS: CPT | Mod: GP | Performed by: PHYSICAL THERAPIST

## 2018-06-07 ENCOUNTER — THERAPY VISIT (OUTPATIENT)
Dept: PHYSICAL THERAPY | Facility: CLINIC | Age: 68
End: 2018-06-07
Payer: MEDICARE

## 2018-06-07 DIAGNOSIS — R26.9 ABNORMAL GAIT: ICD-10-CM

## 2018-06-07 DIAGNOSIS — Z47.1: ICD-10-CM

## 2018-06-07 DIAGNOSIS — R60.9 EDEMA: ICD-10-CM

## 2018-06-07 DIAGNOSIS — Z96.651 STATUS POST TOTAL RIGHT KNEE REPLACEMENT: ICD-10-CM

## 2018-06-07 DIAGNOSIS — Z96.629: ICD-10-CM

## 2018-06-07 DIAGNOSIS — M25.561 RIGHT KNEE PAIN: ICD-10-CM

## 2018-06-07 PROCEDURE — 97110 THERAPEUTIC EXERCISES: CPT | Mod: GP | Performed by: PHYSICAL THERAPIST

## 2018-06-11 ENCOUNTER — THERAPY VISIT (OUTPATIENT)
Dept: PHYSICAL THERAPY | Facility: CLINIC | Age: 68
End: 2018-06-11
Payer: MEDICARE

## 2018-06-11 DIAGNOSIS — R60.9 EDEMA: ICD-10-CM

## 2018-06-11 DIAGNOSIS — M25.561 RIGHT KNEE PAIN: ICD-10-CM

## 2018-06-11 DIAGNOSIS — R26.9 ABNORMAL GAIT: ICD-10-CM

## 2018-06-11 DIAGNOSIS — Z96.651 STATUS POST TOTAL RIGHT KNEE REPLACEMENT: ICD-10-CM

## 2018-06-11 PROCEDURE — 97110 THERAPEUTIC EXERCISES: CPT | Mod: GP | Performed by: PHYSICAL THERAPIST

## 2018-06-26 ENCOUNTER — THERAPY VISIT (OUTPATIENT)
Dept: PHYSICAL THERAPY | Facility: CLINIC | Age: 68
End: 2018-06-26
Payer: MEDICARE

## 2018-06-26 DIAGNOSIS — Z96.651 STATUS POST TOTAL RIGHT KNEE REPLACEMENT: ICD-10-CM

## 2018-06-26 DIAGNOSIS — R26.9 ABNORMAL GAIT: ICD-10-CM

## 2018-06-26 DIAGNOSIS — M25.561 RIGHT KNEE PAIN: ICD-10-CM

## 2018-06-26 DIAGNOSIS — R60.9 EDEMA: ICD-10-CM

## 2018-06-26 PROCEDURE — 97112 NEUROMUSCULAR REEDUCATION: CPT | Mod: GP | Performed by: PHYSICAL THERAPIST

## 2018-06-26 PROCEDURE — 97110 THERAPEUTIC EXERCISES: CPT | Mod: GP | Performed by: PHYSICAL THERAPIST

## 2018-07-10 ENCOUNTER — THERAPY VISIT (OUTPATIENT)
Dept: PHYSICAL THERAPY | Facility: CLINIC | Age: 68
End: 2018-07-10
Payer: MEDICARE

## 2018-07-10 DIAGNOSIS — R26.9 ABNORMAL GAIT: ICD-10-CM

## 2018-07-10 DIAGNOSIS — Z96.651 STATUS POST TOTAL RIGHT KNEE REPLACEMENT: ICD-10-CM

## 2018-07-10 DIAGNOSIS — M25.561 RIGHT KNEE PAIN: ICD-10-CM

## 2018-07-10 DIAGNOSIS — R60.9 EDEMA: ICD-10-CM

## 2018-07-10 PROCEDURE — 97112 NEUROMUSCULAR REEDUCATION: CPT | Mod: GP | Performed by: PHYSICAL THERAPIST

## 2018-07-10 PROCEDURE — 97110 THERAPEUTIC EXERCISES: CPT | Mod: GP | Performed by: PHYSICAL THERAPIST

## 2018-07-10 PROCEDURE — G8979 MOBILITY GOAL STATUS: HCPCS | Mod: GP | Performed by: PHYSICAL THERAPIST

## 2018-07-10 PROCEDURE — G8978 MOBILITY CURRENT STATUS: HCPCS | Mod: GP | Performed by: PHYSICAL THERAPIST

## 2018-07-10 ASSESSMENT — ACTIVITIES OF DAILY LIVING (ADL)
AS_A_RESULT_OF_YOUR_KNEE_INJURY,_HOW_WOULD_YOU_RATE_YOUR_CURRENT_LEVEL_OF_DAILY_ACTIVITY?: NEARLY NORMAL
STAND: ACTIVITY IS NOT DIFFICULT
STIFFNESS: I HAVE THE SYMPTOM BUT IT DOES NOT AFFECT MY ACTIVITY
GO UP STAIRS: ACTIVITY IS MINIMALLY DIFFICULT
KNEEL ON THE FRONT OF YOUR KNEE: I AM UNABLE TO DO THE ACTIVITY
WEAKNESS: THE SYMPTOM AFFECTS MY ACTIVITY SLIGHTLY
WALK: ACTIVITY IS NOT DIFFICULT
KNEE_ACTIVITY_OF_DAILY_LIVING_SCORE: 80
LIMPING: I DO NOT HAVE THE SYMPTOM
HOW_WOULD_YOU_RATE_THE_CURRENT_FUNCTION_OF_YOUR_KNEE_DURING_YOUR_USUAL_DAILY_ACTIVITIES_ON_A_SCALE_FROM_0_TO_100_WITH_100_BEING_YOUR_LEVEL_OF_KNEE_FUNCTION_PRIOR_TO_YOUR_INJURY_AND_0_BEING_THE_INABILITY_TO_PERFORM_ANY_OF_YOUR_USUAL_DAILY_ACTIVITIES?: 85
SWELLING: I HAVE THE SYMPTOM BUT IT DOES NOT AFFECT MY ACTIVITY
RISE FROM A CHAIR: ACTIVITY IS NOT DIFFICULT
GO DOWN STAIRS: ACTIVITY IS MINIMALLY DIFFICULT
GIVING WAY, BUCKLING OR SHIFTING OF KNEE: I DO NOT HAVE THE SYMPTOM
HOW_WOULD_YOU_RATE_THE_OVERALL_FUNCTION_OF_YOUR_KNEE_DURING_YOUR_USUAL_DAILY_ACTIVITIES?: NEARLY NORMAL
KNEE_ACTIVITY_OF_DAILY_LIVING_SUM: 56
RAW_SCORE: 56
SQUAT: ACTIVITY IS SOMEWHAT DIFFICULT
PAIN: I HAVE THE SYMPTOM BUT IT DOES NOT AFFECT MY ACTIVITY
SIT WITH YOUR KNEE BENT: ACTIVITY IS NOT DIFFICULT

## 2018-07-10 NOTE — LETTER
[facility] PHYSICAL THERAP[facility]  [address] [address]  [phone]    2018    Re: Ekaterina Delcid   :   1950  MRN:  [id number]   REFERRING PHYSICIAN:   Dalila Munson    [facility] PHYSICAL [facility]    Date of Initial Evaluation:  18  Visits:  Rxs Used: 8  Reason for Referral:     Right knee pain  Edema  Abnormal gait  Status post total right knee replacement    PROGRESS  REPORT    Progress reporting period is from 18 to 7/10/18.       SUBJECTIVE  Subjective changes noted by patient: Subjective: Pt reports that she has been able to do more with the knee. Notes that she has begun doing some pilates weekly. Notes that she also did some water exercises which felt good.     Current Pain level: 0/10.     Initial Pain level: 5/10.   Changes in function:  Yes (See Goal flowsheet attached for changes in current functional level)  Adverse reaction to treatment or activity: None    OBJECTIVE  Changes noted in objective findings:  Yes,   Objective: R knee 0-123. R hip abd 4/5. Stairs reciprocally w/o railings   Knee Activity of Daily Living Score: 80       ASSESSMENT/PLAN  Updated problem list and treatment plan: Diagnosis 1:  S/p R TKA  Decreased strength - home program  Impaired balance - home program  Impaired muscle performance - home program  Decreased function - home program  STG/LTGs have been met or progress has been made towards goals:  Yes (See Goal flow sheet completed today.)  Assessment of Progress: The patient's condition has potential to improve.  The patient has met all of their long term goals.  Self Management Plans:  Patient is independent in a home treatment program.  Ekaterina continues to require the following intervention to meet STG and LTG's:  PT intervention is no longer required to meet STG/LTG.  Re: Ekaterina Delcid   :   1950      Recommendations:  This patient is  ready to be discharged from therapy and continue their home treatment program.    Please refer to the daily flowsheet for treatment today, total treatment time and time spent performing 1:1 timed codes.      Thank you for your referral.    INQUIRIES  Therapist: Roscoe Gorman DPT  [facility]  [address] [place] [id number]  Phone: [phone]  Fax: [phone]

## 2018-07-10 NOTE — PROGRESS NOTES
Subjective:  HPI       Knee Activity of Daily Living Score: 80            Objective:  System    Physical Exam    General     ROS    Assessment/Plan:    PROGRESS  REPORT    Progress reporting period is from 5/18/18 to 7/10/18.       SUBJECTIVE  Subjective changes noted by patient: Subjective: Pt reports that she has been able to do more with the knee. Notes that she has begun doing some pilates weekly. Notes that she also did some water exercises which felt good.     Current Pain level: 0/10.     Initial Pain level: 5/10.   Changes in function:  Yes (See Goal flowsheet attached for changes in current functional level)  Adverse reaction to treatment or activity: None    OBJECTIVE  Changes noted in objective findings:  Yes,   Objective: R knee 0-123. R hip abd 4/5. Stairs reciprocally w/o railings     ASSESSMENT/PLAN  Updated problem list and treatment plan: Diagnosis 1:  S/p R TKA  Decreased strength - home program  Impaired balance - home program  Impaired muscle performance - home program  Decreased function - home program  STG/LTGs have been met or progress has been made towards goals:  Yes (See Goal flow sheet completed today.)  Assessment of Progress: The patient's condition has potential to improve.  The patient has met all of their long term goals.  Self Management Plans:  Patient is independent in a home treatment program.  Ekaterina continues to require the following intervention to meet STG and LTG's:  PT intervention is no longer required to meet STG/LTG.    Recommendations:  This patient is ready to be discharged from therapy and continue their home treatment program.    Please refer to the daily flowsheet for treatment today, total treatment time and time spent performing 1:1 timed codes.

## 2018-07-10 NOTE — MR AVS SNAPSHOT
After Visit Summary   7/10/2018    Ekaterina Delcid    MRN: [id number]           Patient Information     Date Of Birth          1950        Visit Information        Provider Department      7/10/2018 11:30 AM Roscoe Gorman PT [facility] Physical [facility]        Today's Diagnoses     Right knee pain        Edema        Abnormal gait        Status post total right knee replacement           Follow-ups after your visit        Who to contact     If you have questions or need follow up information about today's clinic visit or your schedule please contact [facility] PHYSICAL [facility] directly at [phone].  Normal or non-critical lab and imaging results will be communicated to you by MyChart, letter or phone within 4 business days after the clinic has received the results. If you do not hear from us within 7 days, please contact the clinic through MyChart or phone. If you have a critical or abnormal lab result, we will notify you by phone as soon as possible.  Submit refill requests through [employer] or call your pharmacy and they will forward the refill request to us. Please allow 3 business days for your refill to be completed.          Additional Information About Your Visit        Care EveryWhere ID     This is your Care EveryWhere ID. This could be used by other organizations to access your [facility] medical records  [id number]         Blood Pressure from Last 3 Encounters:   05/19/15 133/89   11/10/14 135/64   09/30/14 123/83    Weight from Last 3 Encounters:   07/16/14 72.8 kg (160 lb 8 oz)   07/09/14 73.5 kg (162 lb)   07/09/14 73.5 kg (162 lb)              We Performed the Following     EMIR PROGRESS NOTES REPORT     NEUROMUSCULAR RE-EDUCATION     THERAPEUTIC EXERCISES        Primary Care Provider Office Phone # Fax #    Mark Torres [phone]       [facility] [place] [facility] [address]  [facility] [address]        Equal Access to Services     [facility] LILLIE : Hadii aad ku haddoraesequiel Davidali, wayasmeenda nayelyrejiha, iwona shannantyronefam sweet, zafar corwe. So [facility] [phone].    ATENCIÓN: Si habla español, tiene a dobson disposición servicios gratuitos de asistencia lingüística. Howie al [phone].    We comply with applicable federal civil rights laws and [place] laws. We do not discriminate on the basis of race, color, national origin, age, disability, sex, sexual orientation, or gender identity.            Thank you!     Thank you for choosing [facility] THERAP[facility]  for your care. Our goal is always to provide you with excellent care. Hearing back from our patients is one way we can continue to improve our services. Please take a few minutes to complete the written survey that you may receive in the mail after your visit with us. Thank you!             Your Updated Medication List - Protect others around you: Learn how to safely use, store and throw away your medicines at www.disposemymeds.org.          This list is accurate as of 7/10/18 12:05 PM.  Always use your most recent med list.                   Brand Name Dispense Instructions for use Diagnosis    ASPIRIN PO      Take 81 mg by mouth daily        ATORVASTATIN CALCIUM PO      Take 40 mg by mouth daily        BUSPIRONE HCL PO      Take 30 mg by mouth 2 times daily        carboxymethylcellulose 0.5 % Soln ophthalmic solution    REFRESH PLUS    1 Bottle    Place 1 drop into both eyes 3 times daily as needed for dry eyes    Dry eyes, bilateral       COENZYME Q-10 PO      Take 100 mg by mouth daily         MG Caps      Take 3 capsules by mouth daily        DHA-EPA-Vit B6-B12-Folic Acid Caps      Take 1 capsule by mouth daily        diclofenac 1 % Gel topical gel    VOLTAREN    100 g    Apply 4 grams to knees or 2 grams to hands four times daily using enclosed dosing card.     Generalized osteoarthrosis, unspecified site       gabapentin 300 MG capsule    NEURONTIN    120 capsule    Take 1 capsule (300 mg) by mouth 4 times daily    Chronic pain       LEVOTHYROXINE SODIUM PO      Take 0.125 mg by mouth 1 tablet 6 days per week and 1/2 tablet the 7th day        LISINOPRIL PO      Take 5 mg by mouth daily        magnesium 100 MG Caps      Take 100 mg by mouth daily        METOPROLOL TARTRATE PO      Take 12.5 mg by mouth daily        MULTIVITAMIN PO      Take 2 capsules by mouth daily        NITROGLYCERIN SL      Place 0.4 mg under the tongue every 5 minutes as needed        TYLENOL PO      Take 500-1,000 mg by mouth every 4 hours as needed for mild pain or fever        VISTARIL PO      Take 50 mg by mouth 4 times daily        Vitamin D-3 1000 units Caps      Take 1 capsule by mouth daily        ZOLOFT 100 MG tablet   Generic drug:  sertraline      Take by mouth daily        zolpidem 6.25 MG CR tablet    AMBIEN CR    15 tablet    Take 1 tablet (6.25 mg) by mouth nightly as needed for sleep    Insomnia

## 2019-04-24 ENCOUNTER — TRANSFERRED RECORDS (OUTPATIENT)
Dept: HEALTH INFORMATION MANAGEMENT | Facility: CLINIC | Age: 69
End: 2019-04-24

## 2019-06-18 ENCOUNTER — OFFICE VISIT (OUTPATIENT)
Dept: SLEEP MEDICINE | Facility: CLINIC | Age: 69
End: 2019-06-18
Payer: MEDICARE

## 2019-06-18 VITALS
HEART RATE: 84 BPM | WEIGHT: 170 LBS | BODY MASS INDEX: 27.32 KG/M2 | RESPIRATION RATE: 16 BRPM | DIASTOLIC BLOOD PRESSURE: 81 MMHG | OXYGEN SATURATION: 98 % | HEIGHT: 66 IN | SYSTOLIC BLOOD PRESSURE: 125 MMHG

## 2019-06-18 DIAGNOSIS — G47.33 OBSTRUCTIVE SLEEP APNEA: ICD-10-CM

## 2019-06-18 DIAGNOSIS — F51.04 CHRONIC INSOMNIA: Primary | ICD-10-CM

## 2019-06-18 PROCEDURE — 90791 PSYCH DIAGNOSTIC EVALUATION: CPT | Performed by: PSYCHOLOGIST

## 2019-06-18 RX ORDER — OMEPRAZOLE 40 MG/1
CAPSULE, DELAYED RELEASE ORAL
Refills: 0 | COMMUNITY
Start: 2019-05-01

## 2019-06-18 RX ORDER — LOSARTAN POTASSIUM 50 MG/1
50 TABLET ORAL
COMMUNITY
Start: 2019-05-10

## 2019-06-18 RX ORDER — CARBOXYMETHYLCELLULOSE SODIUM 5 MG/ML
1 SOLUTION/ DROPS OPHTHALMIC
COMMUNITY
Start: 2014-07-03

## 2019-06-18 RX ORDER — VENLAFAXINE HYDROCHLORIDE 75 MG/1
CAPSULE, EXTENDED RELEASE ORAL
COMMUNITY
Start: 2019-05-17

## 2019-06-18 ASSESSMENT — ANXIETY QUESTIONNAIRES
IF YOU CHECKED OFF ANY PROBLEMS ON THIS QUESTIONNAIRE, HOW DIFFICULT HAVE THESE PROBLEMS MADE IT FOR YOU TO DO YOUR WORK, TAKE CARE OF THINGS AT HOME, OR GET ALONG WITH OTHER PEOPLE: SOMEWHAT DIFFICULT
3. WORRYING TOO MUCH ABOUT DIFFERENT THINGS: SEVERAL DAYS
1. FEELING NERVOUS, ANXIOUS, OR ON EDGE: SEVERAL DAYS
7. FEELING AFRAID AS IF SOMETHING AWFUL MIGHT HAPPEN: NOT AT ALL
6. BECOMING EASILY ANNOYED OR IRRITABLE: SEVERAL DAYS
GAD7 TOTAL SCORE: 4
5. BEING SO RESTLESS THAT IT IS HARD TO SIT STILL: NOT AT ALL
2. NOT BEING ABLE TO STOP OR CONTROL WORRYING: NOT AT ALL

## 2019-06-18 ASSESSMENT — MIFFLIN-ST. JEOR: SCORE: 1314.92

## 2019-06-18 ASSESSMENT — PATIENT HEALTH QUESTIONNAIRE - PHQ9
SUM OF ALL RESPONSES TO PHQ QUESTIONS 1-9: 4
5. POOR APPETITE OR OVEREATING: SEVERAL DAYS

## 2019-06-18 NOTE — NURSING NOTE
"Chief Complaint   Patient presents with     Sleep Problem       Initial /81   Pulse 84   Resp 16   Ht 1.664 m (5' 5.5\")   Wt 77.1 kg (170 lb)   SpO2 98%   BMI 27.86 kg/m   Estimated body mass index is 27.86 kg/m  as calculated from the following:    Height as of this encounter: 1.664 m (5' 5.5\").    Weight as of this encounter: 77.1 kg (170 lb).    Medication Reconciliation: complete     ESS 3  Neck 36cm  Yomaira Mcdaniel        "

## 2019-06-18 NOTE — PATIENT INSTRUCTIONS
Recommend you consider a 10:30- 6 AM sleep or 7.5 in bed maximum.    When someone lays awake in bed over many nights, your body can actually learn to be awake in bed, mainly because that is what has happened so many times.  Your body has actually been  conditioned  or trained to be awake during the night because it has happened so often.  To break this habit you should try to follow these steps to improve your insomnia:    Set a strict bedtime and rise time to keep every day of the week, including weekends, e.g. Go to bed at 1030 PM and get up at 6 AM.    Go to bed at the set time, but only if you are sleepy (not tired or fatigued but drowsy)    Don t lay in bed for more than 15-30 minutes if you can t sleep.  Get up and go do something relaxing like reading or watching TV until you get drowsy again     Get up at the same time every day regardless of how much sleep you get    Use the bedroom only for sleep and sex.  Do NOT watch TV, read, use the computer, play on your cell phone or do work while in bed      Do not take naps during the daytime and avoid any situations where you might get drowsy or fall asleep unintentionally especially in the evening.      Developing Healthy Sleep Thoughts    Insomnia is often is triggered by stressful events such as a change in employment, a separation, medical illness, or loss.  Your response to your sleep problem, mainly your thoughts and behaviors, determines in large part whether your sleeplessness is short -term or develops into chronic insomnia well after the stressful event is over.     This step of your program involves Cognitive Restructuring, a set of skills to change negative and worrisome thoughts.   Insomnia is more likely to persist if you interpret the onset as a threat or loss of control - and because of this begin to monitor your sleep loss and worry about its consequences.  Worry, fears and untrue beliefs about insomnia can become a vicious cycle that activates  your arousal system, strengthen wakefulness and weakens your sleep system.  Negative beliefs about sleep produce increasing stress and pressure to sleep.  We call this unhelpful sleep effort. In this step of your CBT-I program you will learn how to:    ? Change unhelpful thoughts and beliefs about sleep  ? Manage worry before bed                             The following are strategies for changing beliefs and attitudes about sleep than may be contributing to your continued insomnia.    Changing How You Think About Insomnia    We now know that our thoughts and attitudes affect our stress response.  Negative sleep thoughts and unhelpful worry in the evening can have an adverse effect on your insomnia. They can lead to elevated fear or anxiety about sleep, which in turn can aggravate your sleep problem. Thinking more positively and managing worry can improve your health and healing.     Myths about Sleep    ? People need 8 hours of sleep     This is untrue.  Sleep needs vary from person to person.  Most people need between 6-8 hours to feel alert during the day.  People who sleep 7 hours live longer than those who sleep 8 hours.  Research also suggests people who sleep 5 hours live longer than those who sleep 9 hours  .    ? Insomnia is the same as sleep deprivation    Sleep deprivation is lack of sleep due to failure to allow for adequate sleep time.  This is typically not true for insomnia where people have enough time for sleep and even extend their sleep time trying to get more sleep.  Most people with insomnia get about the same amount of sleep as normal sleepers.  The difference is that with insomnia the sleep is fragmented and less consolidated.     You are Getting More Sleep than You Think    Research using objective sleep tests reveal that people with insomnia get an average of one hour more sleep than they think. This is due in part because the brain misperceives Stage 1 and 2 sleep as being awake.  In  addition, stress and arousal while awake in bed changes the brain s perception of time awake.    Examples of Unhealthy Sleep Thoughts    Negative thoughts about sleep can have a profound impact on your ability to get a good night s sleep. Below are some examples of negative thoughts associated with insomnia that may sound familiar to you:    ? I must get 8 hours of sleep to function during the day.  ? I won t get to sleep tonight.  ? Insomnia is going to cause health problems.  ? I am dreading going to bed.  ? I woke up early again.  I know I won t get back to sleep.  ? The reason I feel terrible today is because of my insomnia.  ? I ve totally lost control of my sleep.  ? I can t sleep without a sleeping pill.    Negative thoughts usually occur automatically and feel like a knee-jerk reaction.  They are often untrue and distorted, especially late in the evening as you become increasingly tired and others are asleep.      Cognitive Restructuring:  Changing Unhealthy Sleep Thoughts    Now that you understand the impact that negative thoughts can have on your sleep, you are ready to change these unhelpful thoughts using cognitive restructuring.  The process is powerful and simple:  By recognizing and replacing your negative thoughts about sleep with more accurate, positive thoughts, you will be less anxious and frustrated about your sleep. A more realistic and positive attitude about sleep will allow you to relax and sleep more easily through the night.          There are several important steps involved in changing your unhelpful and negative thoughts about sleep:            Examples of Healthy Sleep Thoughts    ? My work will not suffer much if I have a poor night's sleep.    ? I m probably getting more sleep than I think.    ? Other things than my sleep affect my daytime functioning.    ? Because I didn t sleep well last night, I am more likely to sleep well tonight because there is increased biological sleep  pressure to sleep deeper to recover my sleep loss.    ? Sleep requirements vary from one person to another.    ? If I don t sleep well, most of the time the worst that can happen is that my mood might not be as bright the next day.    ? If I awaken after about five hours of sleep, I have gotten the core sleep I need for the day.    ? I m more likely to fall asleep the longer I ve been awake    ? I m more likely to fall asleep as my body temperature begins to decrease through the night.    ? My functioning will improve during the day as my body s wakefulness system takes charge.    ? These sleep skills have worked for others, and they can work for me.    Other Recommendations for Changing Beliefs and Attitudes About Your Sleep    ? Keep Realistic Expectations     There is a widespread belief that 8 hours of sleep is necessary to feel refreshed and function well during the day. Many believe that good sleep means never waking up at night.  Others come to expect that they should always wake up in the morning feeling full of energy.  Concerns may arise when your actual sleep falls short of these expectations. To overcome insomnia avoid placing undue pressure on yourself to achieve certain sleep levels.  It increases performance anxiety about sleeping.  Focus on quality sleep not quantity of sleep.      ? Revise Your Thoughts about the Causes of Insomnia      There is a natural tendency to attribute our sleep problems completely to external factors such as a chemical imbalance, pain, aging or things over which we may have little control.  Although these factors may contribute to your insomnia, research shows CBT-I is beneficial even if they are present.    ? Don t Blame Insomnia for All Daytime Impairments    Many individuals blame insomnia for every symptom or concern they experience during the day - fatigue, irritability or lack of concentration. Though poor sleep may produce some of these consequences, it us usually  untrue that all daytime impairment is attributable to insomnia.  It is more often that other factors such as stress and co-occurring medical problems affect how you feel during the day.      ? Don t Catastrophize      Catastrophic thinking means making a mountain out of a negative sleep thought molehill.  Some people believe poor sleep will have catastrophic consequences to their physical health, mental health and appearance. Others see insomnia as a complete loss of control.  These perceived consequences of insomnia often prompt people to seek medication or other treatment.  Keep in mind insomnia can be very unpleasant but for the most part is not dangerous.    ? Don t Focus on Sleep     Some people reduce their activity level because of poor sleep.  Although sleep is a necessary part of life, don t make it the focus of your thoughts and concern. Trust that if you engage in health sleep habits, your body will give you the sleep it needs.  Make sure you continue your normal activities despite your insomnia.    ? Never Try to Sleep      Of all the cognitive rules for a better night s sleep the most important one is this:  Never try to force yourself to sleep.   Whenever a person tries too hard to control anything, it usually backfires and makes things worse. Instead, focus on keeping to your prescribed sleep schedule and practicing the five core sleep habits you have previously learned.     Managing Worry before Bed    All human beings worry.  The extent of our worry is what makes a   difference in our quality of life, happiness and health. Uncontrolled worry can affect your sleep by activating your arousal system.  Worry makes your brain, body and heart behave like there is a danger or threat.  This stress response can make it more difficult to fall asleep and stay asleep. The most common types of worry include:    ? Concerned about unfinished tasks  ? Concern over family, finances or work  ? Worry about things  beyond your control.     Scheduling Worry Time    The part of our brain that plans, sorts, and helps manage our emotions is less effective in doing its job as nighttime comes.  Without the usual distractions of the day, we tend to worry more and have trouble putting aside our worries.  A simple technique called Scheduled Worry Time can help to reduce and manage worry as you approach bedtime or in the middle of the night. It is most effective when practiced daily.

## 2019-06-18 NOTE — PROGRESS NOTES
SLEEP MEDICINE CONSULTATION  Sleep Psychology    Name: Ekaterina Delcid MRN# [id number]   Age: 69 year old YOB: 1950     Date of Consultation: Jun 18, 2019  Consultation is requested by: Franklin Lafleur  No address on file  Primary care provider: Mark Torres    Reason for Sleep Consultation     Ekaterina Delcid is a 69 year old female seen today for a behavioral sleep medicine consultation because of insomnia associated with ISABEL, anxiety, chronic pain.      Assessment and Plan     Sleep Diagnoses/Recommendations:    1. Chronic insomnia  Chronic history of insomnia is multi factored, with onset associated with heart attack approximately 20 years ago.  Chronic pain, obstructive sleep apnea, anxiety and psychophysiologic factors including sleep specific worry, psychological reliance  on sedative-hypnotic medication and inadequate stimulus control likely play a role in maintenance of insomnia.  At this time patient indicates she is not ready for CBT for insomnia.  We did discuss the 3 PM model and to factor process regarding sleep.  She was open to beginning to look at negative sleep thoughts and sleep specific worry.  Follow-up will be as needed.    2. Obstructive sleep apnea  Patient to return to see Dr. Johnathan Lafleur to revisit fitting for a mandibular advancement device.  She was also referred to the [facility] showroom to consider chinstrap to address morning dry mouth.      See patient instructions for initial treatment recommendations and behavioral sleep plan.    Summary Counseling:      Ekaterina was provided information about the pathophysiology of insomnia and psychophysiological factors contributing to the onset and maintenance of insomnia associated with sleep apnea, chronic pain, anxiety.  Treatment option were discussed including component of cognitive-behavioral therapy for insomnia. The benefits and potential early side effects of treatment including increased daytime sleepiness were  discussed. She was advised to seek medical advise and consultation around use of or changes to prescription sleep medication, Patient was counseled on the importance of avoiding driving if drowsy.        Follow-up:   as needed     History of Present Sleep Complaint     Ekaterina Delcid is a 69 year old year old female who reports a 20-year history of insomnia.  She reports onset in the setting of perimenopause symptoms and heart attack at age of 49.  She states that following this she developed significant anxiety and insomnia.  In recent years she states she is struggled with arthritis and chronic pain.  She has had 2 prior sleep studies, the first diagnosed her with severe sleep apnea and a second in 2017 at the [place] suggested mild sleep apnea.  She saw Dr.Jonathan Miquel DDS for mandibular advancement device.  She did not tolerate CPAP.  She reports that she tolerates the use of dental appliance but continues to breathe out of her mouth and has trouble with dry mouth in the morning.    Patient produced 2 weeks of sleep diary indicating bedtime between 930-10:30 PM sleep latency with medication is less than 30 minutes.  Without medication she states it takes her 45 to 60 minutes to fall asleep.  With medication she estimates she wakes up 0-1 time at night.  On nights without medication she wakes up 5-8 times per night.  She uses Ambien CR 6.25 mg most nights.  Sleep offset varies between 6:15 AM-7:30 AM.  Average total sleep time is approximately 7.2 hours based on sleep diary.    Typical routine is to get into bed before she turns out the light's.  She states that she reads in bed while her  falls asleep immediately.  She lays in bed until she falls asleep, sometimes while reading.  Her bedroom is quite uncomfortable.  She states she spends extended time in bed because she has to lay supine to manage hip and joint pain.    Patient typically drinks 0-1 caffeinated beverage in the morning.  She drinks  "3-4 alcoholic beverages usually in the early evening.  Went on vacation or special occasions she may drink 2-3 alcoholic beverages in the later evening.  She appreciates the negative effect on sleep for middle of the night insomnia.  She does not use recreational drugs.    Ekaterina reports significant sleep specific worry and concern.  She is ambivalent about her use of zolpidem, on one hand she feels psychologically dependent but is also concerned about media reports of risk for Alzheimer's disease.  She is concerned that without medication her sleep to be fragmented.  She is also concerned that poor sleep may increase risk for future cardiac events.    Previous Sleep Studies:    Sleep study completed at [facility].  Patient was seen by Dr. Jocelyn Holland MD.  She states that sleep study in 2015 indicated severe sleep apnea.  She had a follow-up sleep study at [facility] where she indicates the results suggested mild sleep apnea.  She was then referred for basement of dental appliance to the [facility] and Dr. Johnathan Lafleur.        Screening          [place] Sleepiness Scale  Total score - [place]: 3 .    MIGUEL A Total Score: 8, with medication       PHQ-9 SCORE 6/18/2019   PHQ-9 Total Score 4       ROBIN-7 SCORE 6/18/2019   Total Score 4       Patient Activation Score   No flowsheet data found.      Vitals     /81   Pulse 84   Resp 16   Ht 1.664 m (5' 5.5\")   Wt 77.1 kg (170 lb)   SpO2 98%   BMI 27.86 kg/m       Medical History     Patient Active Problem List   Diagnosis     Opiate withdrawal (H)     Chemical dependency (H)     Aftercare following elbow joint replacement surgery     Abnormal gait     Right knee pain     Edema     Status post total right knee replacement        Current Outpatient Medications   Medication Sig Dispense Refill     Acetaminophen (TYLENOL PO) Take 500-1,000 mg by mouth every 4 hours as needed for mild pain or fever       ASPIRIN PO Take 81 mg by mouth " daily       ATORVASTATIN CALCIUM PO Take 40 mg by mouth daily       carboxymethylcellulose (CARBOXYMETHYLCELLULOSE SODIUM) 0.5 % SOLN ophthalmic solution Apply 1 drop to eye       Cholecalciferol (VITAMIN D-3) 1000 UNITS CAPS Take 1 capsule by mouth daily       COENZYME Q-10 PO Take 100 mg by mouth daily       DHA-EPA-Vit B6-B12-Folic Acid CAPS Take 1 capsule by mouth daily       diclofenac (VOLTAREN) 1 % GEL Apply 4 grams to knees or 2 grams to hands four times daily using enclosed dosing card. 100 g 1     Docosahexaenoic Acid (DHA) 200 MG CAPS Take 3 capsules by mouth daily       HydrOXYzine Pamoate (VISTARIL PO) Take 50 mg by mouth 4 times daily       LEVOTHYROXINE SODIUM PO Take 0.125 mg by mouth 1 tablet 6 days per week and 1/2 tablet the 7th day       losartan (COZAAR) 50 MG tablet Take 50 mg by mouth       magnesium 100 MG CAPS Take 100 mg by mouth daily       METOPROLOL TARTRATE PO Take 12.5 mg by mouth daily       Multiple Vitamins-Minerals (MULTIVITAMIN OR) Take 2 capsules by mouth daily       omeprazole (PRILOSEC) 40 MG DR capsule TAKE 1 CAPSULE BY MOUTH EMPTY STOMACH 30 MINUTES BEFORE BREAKFAST  0     venlafaxine (EFFEXOR-XR) 75 MG 24 hr capsule Take one capsule daily for 2 weeks then increase to 2 capsules in the morning daily.       zolpidem (AMBIEN CR) 6.25 MG CR tablet Take 1 tablet (6.25 mg) by mouth nightly as needed for sleep 15 tablet 0     NITROGLYCERIN SL Place 0.4 mg under the tongue every 5 minutes as needed         Past Surgical History:   Procedure Laterality Date     ARTHRODESIS TOE(S)  10/21/2013    Procedure: ARTHRODESIS TOE(S);  RIGHT GREAT TOE FUSION, BILATERAL 2ND AND 3RD CLAW TOE REPAIR, CORTISONE INJECTION LEFT FOOT;  Surgeon: Jesus Alberto Estrada MD;  [facility]: [facility]     BREAST SURGERY      breast reconstruction x 2     EYE SURGERY      surgery for disconjugate gaze     GYN SURGERY      hysteroscopy     GYN SURGERY      endometrial ablation     GYN SURGERY       birth      GYN SURGERY      tubal ligation     INJECT STEROID (LOCATION)  10/21/2013    Procedure: INJECT STEROID (LOCATION);;  Surgeon: Jesus Alberto Estrada MD;  Location: [facility]     MASTECTOMY      for in situ carcinoma     ORTHOPEDIC SURGERY  May 2011    laminectomy L4-5 decompression     REPAIR HAMMER TOE BILATERAL  10/21/2013    Procedure: REPAIR HAMMER TOE BILATERAL;;  Surgeon: Jesus Alberto Estrada MD;  Location: [facility]          Allergies   Allergen Reactions     Bee Venom      Bee stings cause local reaction       No Known Drug Allergy          Psychiatric History     Prior Psychiatric Diagnoses: yes, anxiety per medical record   Psychiatric Hospitalizations: none   Use of Psychotropics yes, See above      Chemical Use     Prior Chemical Dependency Treatment: Per medical records opiate dependence related to medical treatment         Family History     No family history on file.    Sleep Disorders: None reported    Social History     Social History     Socioeconomic History     Marital status:      Spouse name: None     Number of children: None     Years of education: None     Highest education level: None   Occupational History     None   Social Needs     Financial resource strain: None     Food insecurity:     Worry: None     Inability: None     Transportation needs:     Medical: None     Non-medical: None   Tobacco Use     Smoking status: Never Smoker     Smokeless tobacco: Never Used   Substance and Sexual Activity     Alcohol use: Yes     Comment: 2-3 drinks per week     Drug use: No     Sexual activity: None   Lifestyle     Physical activity:     Days per week: None     Minutes per session: None     Stress: None   Relationships     Social connections:     Talks on phone: None     Gets together: None     Attends [employer] service: None     Active member of club or organization: None     Attends meetings of clubs or organizations: None     Relationship status: None     Intimate partner violence:     Fear of  current or ex partner: None     Emotionally abused: None     Physically abused: None     Forced sexual activity: None   Other Topics Concern     None   Social History Narrative     None       , retired     Mental Status Examination     Ekaterina presented as appropriately dressed and groomed and was oriented X3 with speech language intact.  The patient was cooperative throughout the evaluation with no signs of hallucinations, delusions, loosening of associations or other thought disturbance.  Mood was anxious.  Affect was congruent with mood. Insight and judgement we intact.  Memory was intact for recent and remote elements.  There was no report of suicidal ideation, intention or plan. Attention and concentration were within normal.      Time Spent:  55 minutes    Copy:   Mark Torres  No address on file    Franklin Ralph PsyD, BETTIE, DBSM  Diplomate, Behavioral Sleep Medicine  [facility]  [place] [facility] [place]

## 2019-06-19 ASSESSMENT — ANXIETY QUESTIONNAIRES: GAD7 TOTAL SCORE: 4

## 2021-06-01 VITALS — BODY MASS INDEX: 29.25 KG/M2 | HEIGHT: 66 IN | WEIGHT: 182 LBS

## 2021-06-01 VITALS — WEIGHT: 182 LBS | BODY MASS INDEX: 29.25 KG/M2 | HEIGHT: 66 IN

## 2021-06-17 NOTE — ANESTHESIA PREPROCEDURE EVALUATION
Anesthesia Evaluation      Patient summary reviewed   No history of anesthetic complications     Airway   Mallampati: I  Neck ROM: full   Pulmonary - negative ROS and normal exam   (+) sleep apnea on other, ,                          Cardiovascular - negative ROS and normal exam  (+) hypertension well controlled, CAD, CABG/stent, , hypercholesterolemia,      Neuro/Psych - negative ROS   (+) anxiety/panic attacks,     Endo/Other - negative ROS   (+) hypothyroidism, arthritis,      GI/Hepatic/Renal - negative ROS           Dental - normal exam                        Anesthesia Plan  Planned anesthetic: spinal and peripheral nerve block  Peripheral nerve block for post-op analgesia as ordered by surgeon.  ACB + STNB.  ASA 3     Anesthetic plan and risks discussed with: patient    Post-op plan: routine recovery

## 2021-06-17 NOTE — ANESTHESIA PROCEDURE NOTES
Peripheral Block    Patient location during procedure: pre-op  Start time: 4/24/2018 9:45 AM  End time: 4/24/2018 9:48 AM  post-op analgesia per surgeon order as noted in medical record  Staffing:  Performing  Anesthesiologist: KEELY ROMAN  Preanesthetic Checklist  Completed: patient identified, site marked, risks, benefits, and alternatives discussed, timeout performed, consent obtained, airway assessed, oxygen available, suction available, emergency drugs available and hand hygiene performed  Peripheral Block  Block type: saphenous, adductor canal block  Prep: ChloraPrep  Patient position: supine  Patient monitoring: blood pressure, heart rate, continuous pulse oximetry and cardiac monitor  Laterality: right  Injection technique: ultrasound guided    Ultrasound used to visualize needle placement in proximity to nerve being blocked: yes   Permanent ultrasound image captured for medical record      Needle  Needle type: Stimuplex   Needle gauge: 20G  Needle length: 6 in  no peripheral nerve catheter placed  Assessment  Injection assessment: negative aspiration for heme, no paresthesia on injection, incremental injection and no difficulty with injection  Additional Notes  15ml 0.5% ropivacaine w/ epi 1:200K injected for nerve block.

## 2021-06-17 NOTE — ANESTHESIA POSTPROCEDURE EVALUATION
Patient: Ekaterina Delcid   RIGHT TOTAL KNEE ARTHROPLASTY  Anesthesia type: spinal    Patient location: PACU  Last vitals:   Vitals:    04/24/18 1340   BP: 139/75   Pulse: 75   Resp: 18   Temp: 36.7  C (98  F)   SpO2: 96%     Post vital signs: stable  Level of consciousness: awake and responds to simple questions  Post-anesthesia pain: pain controlled  Post-anesthesia nausea and vomiting: no  Pulmonary: unassisted, return to baseline  Cardiovascular: stable and blood pressure at baseline  Hydration: adequate  Anesthetic events: no    QCDR Measures:  ASA# 11 - Nataliya-op Cardiac Arrest: ASA11B - Patient did NOT experience unanticipated cardiac arrest  ASA# 12 - Nataliya-op Mortality Rate: ASA12B - Patient did NOT die  ASA# 13 - PACU Re-Intubation Rate: NA - No ETT / LMA used for case  ASA# 10 - Composite Anes Safety: ASA10A - No serious adverse event    Additional Notes:

## 2021-06-17 NOTE — ANESTHESIA PROCEDURE NOTES
Peripheral Block    Patient location during procedure: pre-op  Start time: 4/24/2018 9:48 AM  End time: 4/24/2018 9:51 AM  post-op analgesia per surgeon order as noted in medical record  Staffing:  Performing  Anesthesiologist: KEELY ROMAN  Preanesthetic Checklist  Completed: patient identified, site marked, risks, benefits, and alternatives discussed, timeout performed, consent obtained, airway assessed, oxygen available, suction available, emergency drugs available and hand hygiene performed  Peripheral Block  Block type: other (Selective tibial nerve block), tibial  Prep: ChloraPrep  Patient position: supine  Patient monitoring: cardiac monitor, continuous pulse oximetry, heart rate and blood pressure  Laterality: right  Injection technique: ultrasound guided    Ultrasound used to visualize needle placement in proximity to nerve being blocked: yes   Permanent ultrasound image captured for medical record      Needle  Needle type: Stimuplex   Needle gauge: 20G  Needle length: 6 in  no peripheral nerve catheter placed  Assessment  Injection assessment: negative aspiration for heme, no paresthesia on injection, incremental injection and no difficulty with injection  Additional Notes  5ml 0.25% bupivacaine w/ epi 1:200K injected for nerve block.

## 2021-06-17 NOTE — ANESTHESIA PROCEDURE NOTES
Spinal Block    Patient location during procedure: OR  Start time: 4/24/2018 10:14 AM  End time: 4/24/2018 10:17 AM  Reason for block: primary anesthetic    Staffing:  Performing  Anesthesiologist: YANCY LAMB A    Preanesthetic Checklist  Completed: patient identified, risks, benefits, and alternatives discussed, timeout performed, consent obtained, at patient's request, airway assessed, oxygen available, suction available, emergency drugs available and hand hygiene performed  Spinal Block  Patient position: sitting  Prep: ChloraPrep  Patient monitoring: heart rate, cardiac monitor, continuous pulse ox and blood pressure  Approach: midline  Location: L3-4  Injection technique: single-shot  Needle type: pencil-tip   Needle gauge: 24 G

## 2021-06-17 NOTE — ANESTHESIA CARE TRANSFER NOTE
Last vitals:   Vitals:    04/24/18 1212   BP: 140/71   Pulse: 75   Resp: 16   Temp: 37.2  C (99  F)   SpO2: 99%     Patient's level of consciousness is drowsy  Spontaneous respirations: yes  Maintains airway independently: yes  Dentition unchanged: yes  Oropharynx: oropharynx clear of all foreign objects    QCDR Measures:  ASA# 20 - Surgical Safety Checklist: WHO surgical safety checklist completed prior to induction  PQRS# 430 - Adult PONV Prevention: 4558F - Pt received => 2 anti-emetic agents (different classes) preop & intraop  ASA# 8 - Peds PONV Prevention: NA - Not pediatric patient, not GA or 2 or more risk factors NOT present  PQRS# 424 - Nataliya-op Temp Management: 4559F - At least one body temp DOCUMENTED => 35.5C or 95.9F within required timeframe  PQRS# 426 - PACU Transfer Protocol: - Transfer of care checklist used  ASA# 14 - Acute Post-op Pain: ASA14B - Patient did NOT experience pain >= 7 out of 10

## 2021-12-22 ENCOUNTER — HOSPITAL ENCOUNTER (EMERGENCY)
Facility: CLINIC | Age: 71
Discharge: HOME | End: 2021-12-22
Attending: EMERGENCY MEDICINE | Admitting: EMERGENCY MEDICINE
Payer: MEDICARE

## 2021-12-22 VITALS
SYSTOLIC BLOOD PRESSURE: 162 MMHG | DIASTOLIC BLOOD PRESSURE: 88 MMHG | HEART RATE: 80 BPM | HEIGHT: 65 IN | BODY MASS INDEX: 28.32 KG/M2 | OXYGEN SATURATION: 100 % | TEMPERATURE: 97.9 F | WEIGHT: 170 LBS | RESPIRATION RATE: 16 BRPM

## 2021-12-22 DIAGNOSIS — S01.111A LACERATION OF RIGHT EYEBROW, INITIAL ENCOUNTER: ICD-10-CM

## 2021-12-22 PROCEDURE — 99283 EMERGENCY DEPT VISIT LOW MDM: CPT

## 2021-12-22 PROCEDURE — 12011 RPR F/E/E/N/L/M 2.5 CM/<: CPT

## 2021-12-22 ASSESSMENT — MIFFLIN-ST. JEOR: SCORE: 1296.99

## 2021-12-22 NOTE — ED PROVIDER NOTES
History   Chief Complaint:  Fall    HPI   History supplemented by electronic chart review    Ekaterina Delcid is a 71 year old female with history of osteoporosis who presents with a laceration above her right eye. The patient states that she tripped and fell as she was walking to a dental appointment. She hit the right side of her head and braced the fall with her right arm. She denies any loss of consciousness, nor any headache, nausea, or vomiting. She is not on any blood thinners. She did not chip any teeth and did get treated at the dentist's office for both the pre-existing checkup as well as examination with regard to possible dental or facial trauma no additional findings were found beyond the apparent eyebrow laceration after the fall.  She was given a dose of ibuprofen and some ice at her dental clinic and referred here for sutures.  she denies any other trauma or pain.   No arm pain.  She is here because she would like to have sutures and then feels that she can safely leave.    Review of Systems   All other systems reviewed and are negative.    Allergies:  Bee Venom    Medications:  Tylenol  Aspirin  Atorvastatin  Vistaril  Coreg  Lorazepam  Celebrex  Flexeril  Sinequan  Cymbalta  Zetia  Flonase  Ativan  Antivert  Protonix  Toprol  Crestor  Synthroid  Cozaar  Nitroglycerin  Ambien    Past Medical History:     Anxiety  Arthritis  Cardiac arrest  CAD  Heart attack  Hypertension  Shortness of breath  Thyroid disease  Opiate dependency  Osteoporosis    Past Surgical History:    Back surgery  Breast reconstruction x 2  Elbow surgery  Tubal ligation  Mastectomy  Cardiac stent placement  Laminectomy L4-5 decompression  Endometrial ablation  Bilateral hammer toe repair    Family History:    Father: Hypothyroidism, Lymphoma  Mother: Hypothyroidism  Brother: Bladder cancer  Sister: Thyroid disease    Social History:  The patient presents alone. Never smoker.    Physical Exam     Patient Vitals for the past 24  "hrs:   BP Temp Temp src Pulse Resp SpO2 Height Weight   12/22/21 1057 (!) 162/88 97.9  F (36.6  C) Temporal 80 16 100 % 1.651 m (5' 5\") 77.1 kg (170 lb)     Physical Exam  General: Nontoxic-appearing woman sitting upright in room 22  HENT: face with minimal tenderness over the right eyebrow diffusely, full painless ROM mandible, skull nontender, no bony deformity, OP clear, no difficulty controlling secretions, no hemotympanum  Eyes: pupils round and reactive, no nystagmus, no raccoon eyes  CV: rate as above, regular rhythm  Resp: normal effort, speaks in full phrases  GI: abdomen soft and nontender  MSK:  Cervical spine: no midline tenderness, FROM  Extremities: no focal tenderness  Skin:   No abrasion  No ecchymosis  No mastoid ecchymosis.  2.4 cm slightly jagged laceration to right lateral eyebrow with slight gaping but no active bleeding  Neuro: awake, alert, GCS 15, responds appropriately to commands, face symmetric other than right eyebrow,  normal, strength and sensation normal in all extremities, ambulatory with steady gait   psych: cooperative      Emergency Department Course     Procedures    Procedure Note: Laceration Repair   Performed by: ABNER Easton and Sincere Barber MD    Verbal consent given by patient who confirms understanding of the procedure being performed after discussing the risks, benefits and alternatives.    Preparation: Patient was prepped and draped in usual sterile fashion, with assistance from ERT.  Irrigation solution: saline    Body area: Right eyebrow  Laceration length: 24 cm  Contamination: The wound is not grossly contaminated.  Foreign bodies: none  Tendon involvement: none  Anesthesia: Local block  Local anesthetic: Bupivacaine 0.5% without epinephrine    Debridement: none  Skin closure: Closed with 5 x 6.0 Ethilon  Technique: simple interrupted  Approximation: close  Approximation difficulty: simple    Patient tolerated the procedure well with no " immediate complications.    Emergency Department Course:  Reviewed:  I reviewed nursing notes, vitals, past medical history and Care Everywhere    Assessments:  1136 I obtained history and examined the patient as noted above.   1244 I rechecked the patient and repaired her laceration.    Disposition:  The patient was discharged to home.     Impression & Plan   Medical Decision Making:  She presents with obvious laceration to her right eyebrow.  We directly discussed the possibility of CT imaging to evaluate for the possibility of skull fracture, intracranial hemorrhage, facial fracture, and other conditions, though the patient readily agreed to defer any emergent testing.  Given her normal mental status, normal neurologic exam, absence of anticoagulants, absence of headache, and patient preference in combination, I think that CT imaging can be safely deferred.  Return precautions were reviewed and agreed upon.  Suture removal advised to clinic in 5 to 7 days, and she should be seen sooner in the unexpected event of wound healing problems.  Sutures placed in conjunction with PA student Jessi though I was present at bedside and directly participated in hands-on fashion throughout the entire procedure.  Patient content with this plan of care and was discharged home in improved condition.      Diagnosis:    ICD-10-CM    1. Laceration of right eyebrow, initial encounter  S01.111A      Scribe Disclosure:  Mili TOMLIN, am serving as a scribe at 11:19 AM on 12/22/2021 to document services personally performed by Sincere Barber MD based on my observations and the provider's statements to me.     This note was completed in part using Dragon voice recognition software. Although reviewed after completion, some word and grammatical errors may occur.     Sincere Barber MD  12/22/21 2033

## 2022-12-13 ENCOUNTER — HOSPITAL ENCOUNTER (OUTPATIENT)
Dept: MRI IMAGING | Facility: CLINIC | Age: 72
Discharge: HOME | End: 2022-12-13
Attending: PLASTIC SURGERY | Admitting: PLASTIC SURGERY
Payer: MEDICARE

## 2022-12-13 DIAGNOSIS — T85.49XA: ICD-10-CM

## 2022-12-13 PROCEDURE — 255N000002 HC RX 255 OP 636: Performed by: PLASTIC SURGERY

## 2022-12-13 PROCEDURE — A9585 GADOBUTROL INJECTION: HCPCS | Performed by: PLASTIC SURGERY

## 2022-12-13 PROCEDURE — 77049 MRI BREAST C-+ W/CAD BI: CPT

## 2022-12-13 RX ORDER — GADOBUTROL 604.72 MG/ML
8 INJECTION INTRAVENOUS ONCE
Status: COMPLETED | OUTPATIENT
Start: 2022-12-13 | End: 2022-12-13

## 2022-12-13 RX ADMIN — GADOBUTROL 8 ML: 604.72 INJECTION INTRAVENOUS at 09:54

## 2023-04-02 ENCOUNTER — HEALTH MAINTENANCE LETTER (OUTPATIENT)
Age: 73
End: 2023-04-02

## 2024-06-02 ENCOUNTER — HEALTH MAINTENANCE LETTER (OUTPATIENT)
Age: 74
End: 2024-06-02

## 2025-04-13 ENCOUNTER — HEALTH MAINTENANCE LETTER (OUTPATIENT)
Age: 75
End: 2025-04-13

## 2025-06-15 ENCOUNTER — HEALTH MAINTENANCE LETTER (OUTPATIENT)
Age: 75
End: 2025-06-15